# Patient Record
Sex: FEMALE | HISPANIC OR LATINO | Employment: UNEMPLOYED | URBAN - METROPOLITAN AREA
[De-identification: names, ages, dates, MRNs, and addresses within clinical notes are randomized per-mention and may not be internally consistent; named-entity substitution may affect disease eponyms.]

---

## 2017-03-15 ENCOUNTER — HOSPITAL ENCOUNTER (EMERGENCY)
Facility: MEDICAL CENTER | Age: 62
End: 2017-03-15
Payer: MEDICAID

## 2017-03-15 VITALS
HEART RATE: 82 BPM | BODY MASS INDEX: 33.3 KG/M2 | HEIGHT: 61 IN | TEMPERATURE: 98.6 F | RESPIRATION RATE: 16 BRPM | SYSTOLIC BLOOD PRESSURE: 154 MMHG | DIASTOLIC BLOOD PRESSURE: 78 MMHG | OXYGEN SATURATION: 99 % | WEIGHT: 176.37 LBS

## 2017-03-15 PROCEDURE — 302449 STATCHG TRIAGE ONLY (STATISTIC)

## 2017-03-15 ASSESSMENT — PAIN SCALES - GENERAL: PAINLEVEL_OUTOF10: 10

## 2017-03-15 NOTE — ED NOTES
"C/O NAUSEA AND UPPER ABDO PAIN, NO VOMITING, FEELS LIKE WHEN SHE HAD HER GALLBLADDER SURGERY ' I JUST HAD THE STONES OUT' PAIN COMES IN WAVES, ALSO EXP DYSURIA, STARTS STREAM THEN CANNOT FINISH \" HAS TO PUSH IT OUT\" IDDM FSBS 'S  "

## 2017-03-16 ENCOUNTER — OFFICE VISIT (OUTPATIENT)
Dept: CARDIOLOGY | Facility: MEDICAL CENTER | Age: 62
End: 2017-03-16
Payer: MEDICAID

## 2017-03-16 VITALS
DIASTOLIC BLOOD PRESSURE: 84 MMHG | HEART RATE: 84 BPM | OXYGEN SATURATION: 98 % | BODY MASS INDEX: 33.23 KG/M2 | WEIGHT: 176 LBS | HEIGHT: 61 IN | SYSTOLIC BLOOD PRESSURE: 152 MMHG

## 2017-03-16 DIAGNOSIS — R07.2 PRECORDIAL PAIN: ICD-10-CM

## 2017-03-16 DIAGNOSIS — E08.42 DIABETIC POLYNEUROPATHY ASSOCIATED WITH DIABETES MELLITUS DUE TO UNDERLYING CONDITION (HCC): ICD-10-CM

## 2017-03-16 DIAGNOSIS — I10 ESSENTIAL HYPERTENSION, BENIGN: ICD-10-CM

## 2017-03-16 DIAGNOSIS — N18.3 CKD (CHRONIC KIDNEY DISEASE), STAGE 3 (MODERATE): ICD-10-CM

## 2017-03-16 LAB — EKG IMPRESSION: NORMAL

## 2017-03-16 PROCEDURE — 93000 ELECTROCARDIOGRAM COMPLETE: CPT | Performed by: INTERNAL MEDICINE

## 2017-03-16 PROCEDURE — 99244 OFF/OP CNSLTJ NEW/EST MOD 40: CPT | Performed by: INTERNAL MEDICINE

## 2017-03-16 RX ORDER — LANSOPRAZOLE 30 MG/1
30 CAPSULE, DELAYED RELEASE ORAL DAILY
COMMUNITY
End: 2017-03-27

## 2017-03-16 RX ORDER — AMLODIPINE BESYLATE 10 MG/1
10 TABLET ORAL 2 TIMES DAILY
Qty: 180 TAB | Refills: 3 | Status: SHIPPED | OUTPATIENT
Start: 2017-03-16 | End: 2017-03-27

## 2017-03-16 RX ORDER — SUCRALFATE 1 G/1
1 TABLET ORAL
COMMUNITY
End: 2017-04-28

## 2017-03-16 RX ORDER — CEFDINIR 300 MG/1
300 CAPSULE ORAL 2 TIMES DAILY
COMMUNITY
End: 2017-03-27

## 2017-03-16 ASSESSMENT — ENCOUNTER SYMPTOMS
ABDOMINAL PAIN: 0
WEIGHT LOSS: 0
ORTHOPNEA: 0
FALLS: 0
COUGH: 0
EYE PAIN: 0
BRUISES/BLEEDS EASILY: 0
CHILLS: 0
EYE DISCHARGE: 0
SENSORY CHANGE: 0
DEPRESSION: 0
SHORTNESS OF BREATH: 0
FEVER: 0
HEADACHES: 0
HALLUCINATIONS: 0
DIZZINESS: 0
LOSS OF CONSCIOUSNESS: 0
SPEECH CHANGE: 0
NAUSEA: 0
BLURRED VISION: 0
MYALGIAS: 0
PALPITATIONS: 0
DOUBLE VISION: 0
VOMITING: 0
CLAUDICATION: 0
BLOOD IN STOOL: 0
PND: 0

## 2017-03-16 NOTE — Clinical Note
"     [facility]   [address], [place] [address]  [place] [address]  Phone: [phone]  Fax: [phone]              Veda Ford  1955    Encounter Date: 3/16/2017    Lyly Leary M.D.          PROGRESS NOTE:  Subjective:   Veda Ford is a 61 y.o. female who presents today for cardiology and evaluation because of chest pain. With the last several weeks, she has had some tightness on her chest along with shoulder pain. It happens at random times. No specific precipitating factors or symptoms. No prior cardiac conditions. No prior cardiac surgery or procedures.    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 at [place] [facility] [place]   • Colonoscopy  9/6/2013     Performed by Dwight Back M.D. at [place] [facility] [place]   • Other Bilateral 2015     catacrat surgery   • Mago by laparoscopy  3/16/2016     Procedure: MAGO BY LAPAROSCOPY;  Surgeon: Oskar Cheng M.D.;  [place] [facility] [place];  Service:      Family History   Problem Relation Age of Onset   • Diabetes Mother    • Heart Disease Father    • Lung Disease Father    • Diabetes Brother      History   Smoking status   • Never Smoker    Smokeless tobacco   • Never Used     Allergies   Allergen Reactions   • Gabapentin Unspecified     \"Leg aches and pains\"  RXN=1/2016     Outpatient Encounter Prescriptions as of 3/16/2017   Medication " Sig Dispense Refill   • sucralfate (CARAFATE) 1 GM Tab Take 1 g by mouth 4 Times a Day,Before Meals and at Bedtime.     • lansoprazole (PREVACID) 30 MG CAPSULE DELAYED RELEASE Take 30 mg by mouth every day.     • cefdinir (OMNICEF) 300 MG Cap Take 300 mg by mouth 2 times a day.     • amlodipine (NORVASC) 10 MG Tab Take 1 Tab by mouth 2 Times a Day. 180 Tab 3   • aspirin EC (ECOTRIN) 81 MG Tablet Delayed Response Take 81 mg by mouth every day.     • esomeprazole (NEXIUM) 40 MG delayed-release capsule Take 40 mg by mouth every morning before breakfast.     • atorvastatin (LIPITOR) 40 MG Tab Take 40 mg by mouth every evening.     • clonidine (CATAPRES) 0.1 MG Tab Take 0.1 mg by mouth every evening.     • insulin glargine (LANTUS) 100 UNIT/ML SOLN Inject 40 Units as instructed 2 times a day.     • insulin lispro (HUMALOG KWIKPEN) 100 UNIT/ML SOLN Inject 6-20 Units as instructed 3 times a day before meals. Sliding scale.  If BS =3 units  131-199=6 units     • losartan (COZAAR) 100 MG TABS Take 100 mg by mouth every day.     • levothyroxine (SYNTHROID) 100 MCG Tab Take 100 mcg by mouth Every morning on an empty stomach.     • hydrocodone-acetaminophen (NORCO) 5-325 MG Tab per tablet Take 1-2 Tabs by mouth every four hours as needed. (Patient not taking: Reported on 3/16/2017) 30 Tab 0   • loratadine (CLARITIN) 10 MG Tab Take 10 mg by mouth every day.     • amitriptyline (ELAVIL) 25 MG Tab Take 25 mg by mouth every evening.     • [DISCONTINUED] amlodipine (NORVASC) 10 MG TABS Take 10 mg by mouth every day.       No facility-administered encounter medications on file as of 3/16/2017.     Review of Systems   Constitutional: Negative for fever, chills, weight loss and malaise/fatigue.   HENT: Negative for ear discharge, ear pain, hearing loss and nosebleeds.    Eyes: Negative for blurred vision, double vision, pain and discharge.   Respiratory: Negative for cough and shortness of breath.    Cardiovascular: Positive for  "chest pain. Negative for palpitations, orthopnea, claudication, leg swelling and PND.   Gastrointestinal: Negative for nausea, vomiting, abdominal pain, blood in stool and melena.   Genitourinary: Negative for dysuria and hematuria.   Musculoskeletal: Negative for myalgias, joint pain and falls.   Skin: Negative for itching and rash.   Neurological: Negative for dizziness, sensory change, speech change, loss of consciousness and headaches.   Endo/Heme/Allergies: Negative for environmental allergies. Does not bruise/bleed easily.   Psychiatric/Behavioral: Negative for depression, suicidal ideas and hallucinations.        Objective:   /84 mmHg  Pulse 84  Ht 1.549 m (5' 0.98\")  Wt 79.833 kg (176 lb)  BMI 33.27 kg/m2  SpO2 98%    Physical Exam   Constitutional: She is oriented to person, place, and time. No distress.   HENT:   Head: Normocephalic and atraumatic.   Eyes: EOM are normal.   Neck: No JVD present.   Cardiovascular: Normal rate, regular rhythm, normal heart sounds and intact distal pulses.  Exam reveals no gallop and no friction rub.    No murmur heard.  Pulmonary/Chest: No respiratory distress. She has no wheezes. She has no rales. She exhibits no tenderness.   Abdominal: She exhibits no distension. There is no tenderness. There is no rebound and no guarding.   Musculoskeletal: She exhibits no edema or tenderness.   Lymphadenopathy:     She has no cervical adenopathy.   Neurological: She is alert and oriented to person, place, and time.   Skin: Skin is dry.   Psychiatric: She has a normal mood and affect.   Nursing note and vitals reviewed.      Assessment:     1. Precordial pain  ECHOCARDIOGRAM COMP W/O CONT    NM-CARDIAC PET   2. Essential hypertension, benign  EKG    ECHOCARDIOGRAM COMP W/O CONT    NM-CARDIAC PET    amlodipine (NORVASC) 10 MG Tab   3. Diabetic polyneuropathy associated with diabetes mellitus due to underlying condition (CMS-[facility])  ECHOCARDIOGRAM COMP W/O CONT    NM-CARDIAC PET   "   4. CKD (chronic kidney disease), stage 3 (moderate)  ECHOCARDIOGRAM COMP W/O CONT    NM-CARDIAC PET       Medical Decision Making:  Today's Assessment / Status / Plan:     At this time, to further risk stratify the patient, I will obtain a transthoracic echocardiogram to assess for cardiac functions and valvular functions. I will also obtain a myocardial PET scan stress test to assess for coronary ischemia.    Blood pressure is elevated today. I will increase her amlodipine to 10 mg by mouth twice a day. Continue losartan and clonidine. She does have CKD stage 3.    I will see patient back in clinic with lab tests and studies results in 3 months.    I thank you Dr. Reese for referring patient to our Cardiology Clinic today.        Jesika Reese, N.P.  [address]  [place] [address]  VIA Facsimile: [phone]

## 2017-03-16 NOTE — PROGRESS NOTES
"Subjective:   Veda Ford is a 61 y.o. female who presents today for cardiology and evaluation because of chest pain. With the last several weeks, she has had some tightness on her chest along with shoulder pain. It happens at random times. No specific precipitating factors or symptoms. No prior cardiac conditions. No prior cardiac surgery or procedures.    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 at SURGERY [facility]   • Colonoscopy  9/6/2013     Performed by Dwight Back M.D. at [facility]   • Other Bilateral 2015     catacrat surgery   • Mago by laparoscopy  3/16/2016     Procedure: MAGO BY LAPAROSCOPY;  Surgeon: Oskar Cheng M.D.;  Location: SURGERY [facility];  Service:      Family History   Problem Relation Age of Onset   • Diabetes Mother    • Heart Disease Father    • Lung Disease Father    • Diabetes Brother      History   Smoking status   • Never Smoker    Smokeless tobacco   • Never Used     Allergies   Allergen Reactions   • Gabapentin Unspecified     \"Leg aches and pains\"  RXN=1/2016     Outpatient Encounter Prescriptions as of 3/16/2017   Medication Sig Dispense Refill   • sucralfate (CARAFATE) 1 GM Tab Take 1 g by mouth 4 Times a Day,Before Meals and at Bedtime.     • lansoprazole (PREVACID) 30 MG CAPSULE DELAYED RELEASE Take 30 mg by mouth every day.     • cefdinir (OMNICEF) 300 MG Cap Take 300 mg by mouth 2 " times a day.     • amlodipine (NORVASC) 10 MG Tab Take 1 Tab by mouth 2 Times a Day. 180 Tab 3   • aspirin EC (ECOTRIN) 81 MG Tablet Delayed Response Take 81 mg by mouth every day.     • esomeprazole (NEXIUM) 40 MG delayed-release capsule Take 40 mg by mouth every morning before breakfast.     • atorvastatin (LIPITOR) 40 MG Tab Take 40 mg by mouth every evening.     • clonidine (CATAPRES) 0.1 MG Tab Take 0.1 mg by mouth every evening.     • insulin glargine (LANTUS) 100 UNIT/ML SOLN Inject 40 Units as instructed 2 times a day.     • insulin lispro (HUMALOG KWIKPEN) 100 UNIT/ML SOLN Inject 6-20 Units as instructed 3 times a day before meals. Sliding scale.  If BS =3 units  131-199=6 units     • losartan (COZAAR) 100 MG TABS Take 100 mg by mouth every day.     • levothyroxine (SYNTHROID) 100 MCG Tab Take 100 mcg by mouth Every morning on an empty stomach.     • hydrocodone-acetaminophen (NORCO) 5-325 MG Tab per tablet Take 1-2 Tabs by mouth every four hours as needed. (Patient not taking: Reported on 3/16/2017) 30 Tab 0   • loratadine (CLARITIN) 10 MG Tab Take 10 mg by mouth every day.     • amitriptyline (ELAVIL) 25 MG Tab Take 25 mg by mouth every evening.     • [DISCONTINUED] amlodipine (NORVASC) 10 MG TABS Take 10 mg by mouth every day.       No facility-administered encounter medications on file as of 3/16/2017.     Review of Systems   Constitutional: Negative for fever, chills, weight loss and malaise/fatigue.   HENT: Negative for ear discharge, ear pain, hearing loss and nosebleeds.    Eyes: Negative for blurred vision, double vision, pain and discharge.   Respiratory: Negative for cough and shortness of breath.    Cardiovascular: Positive for chest pain. Negative for palpitations, orthopnea, claudication, leg swelling and PND.   Gastrointestinal: Negative for nausea, vomiting, abdominal pain, blood in stool and melena.   Genitourinary: Negative for dysuria and hematuria.   Musculoskeletal: Negative for  "myalgias, joint pain and falls.   Skin: Negative for itching and rash.   Neurological: Negative for dizziness, sensory change, speech change, loss of consciousness and headaches.   Endo/Heme/Allergies: Negative for environmental allergies. Does not bruise/bleed easily.   Psychiatric/Behavioral: Negative for depression, suicidal ideas and hallucinations.        Objective:   /84 mmHg  Pulse 84  Ht 1.549 m (5' 0.98\")  Wt 79.833 kg (176 lb)  BMI 33.27 kg/m2  SpO2 98%    Physical Exam   Constitutional: She is oriented to person, place, and time. No distress.   HENT:   Head: Normocephalic and atraumatic.   Eyes: EOM are normal.   Neck: No JVD present.   Cardiovascular: Normal rate, regular rhythm, normal heart sounds and intact distal pulses.  Exam reveals no gallop and no friction rub.    No murmur heard.  Pulmonary/Chest: No respiratory distress. She has no wheezes. She has no rales. She exhibits no tenderness.   Abdominal: She exhibits no distension. There is no tenderness. There is no rebound and no guarding.   Musculoskeletal: She exhibits no edema or tenderness.   Lymphadenopathy:     She has no cervical adenopathy.   Neurological: She is alert and oriented to person, place, and time.   Skin: Skin is dry.   Psychiatric: She has a normal mood and affect.   Nursing note and vitals reviewed.      Assessment:     1. Precordial pain  ECHOCARDIOGRAM COMP W/O CONT    NM-CARDIAC PET   2. Essential hypertension, benign  EKG    ECHOCARDIOGRAM COMP W/O CONT    NM-CARDIAC PET    amlodipine (NORVASC) 10 MG Tab   3. Diabetic polyneuropathy associated with diabetes mellitus due to underlying condition (CMS-[facility])  ECHOCARDIOGRAM COMP W/O CONT    NM-CARDIAC PET   4. CKD (chronic kidney disease), stage 3 (moderate)  ECHOCARDIOGRAM COMP W/O CONT    NM-CARDIAC PET       Medical Decision Making:  Today's Assessment / Status / Plan:     At this time, to further risk stratify the patient, I will obtain a transthoracic " echocardiogram to assess for cardiac functions and valvular functions. I will also obtain a myocardial PET scan stress test to assess for coronary ischemia.    Blood pressure is elevated today. I will increase her amlodipine to 10 mg by mouth twice a day. Continue losartan and clonidine. She does have CKD stage 3.    I will see patient back in clinic with lab tests and studies results in 3 months.    I thank you Dr. Reese for referring patient to our Cardiology Clinic today.

## 2017-03-16 NOTE — MR AVS SNAPSHOT
"        Veda Ford   3/16/2017 10:00 AM   Office Visit   MRN: [id number]    Department:  [facility]   Dept Phone:  [phone]    Description:  Female : 1955   Provider:  Lyly Leary M.D.           Reason for Visit     New Patient           Allergies as of 3/16/2017     Allergen Noted Reactions    Gabapentin 2015   Unspecified    \"Leg aches and pains\"  RXN=2016      You were diagnosed with     Precordial pain   [786.51.ICD-9-CM]       Essential hypertension, benign   [401.1.ICD-9-CM]       Diabetic polyneuropathy associated with diabetes mellitus due to underlying condition (CMS-[facility])   [2730785]       CKD (chronic kidney disease), stage 3 (moderate)   [8078832]         Vital Signs     Blood Pressure Pulse Height Weight Body Mass Index Oxygen Saturation    152/84 mmHg 84 1.549 m (5' 0.98\") 79.833 kg (176 lb) 33.27 kg/m2 98%    Smoking Status                   Never Smoker            Basic Information     Date Of Birth Sex Race Ethnicity Preferred Language    1955 Female  or   Origin (Estonian,Surinamese,Micronesian,Spanish, etc) English      Your appointments     Mar 16, 2017 10:00 AM   NEW PATIENT with Lyly Leary M.D.   [facility] (--)    [address], [address] [address]  [facility] [id number] [phone]              Problem List              ICD-10-CM Priority Class Noted - Resolved    Essential hypertension, benign I10   2009 - Present    Other and unspecified hyperlipidemia E78.5   2009 - Present    GERD (gastroesophageal reflux disease) K21.9   2009 - Present    Vitamin d deficiency    2009 - Present    Diabetes  High  2009 - Present    Shoulder joint pain M25.519   2009 - Present    Carpal tunnel syndrome G56.00   2009 - Present    Rotator cuff tear    2009 - Present    Diabetic neuropathy (CMS-[facility]) E11.40   2010 - Present    Myalgia M79.1   2010 - Present    Renal " insufficiency N28.9   11/15/2011 - Present    Hypothyroid E03.9   11/15/2011 - Present    Microalbuminuria R80.9   11/15/2011 - Present    ARF (acute renal failure) (CMS-HCC) N17.9 High  4/26/2012 - Present    Hyperglycemia R73.9 High  4/26/2012 - Present    Lower GI bleed K92.2   9/5/2013 - Present    DIABETES MELLITUS    9/6/2013 - Present    Acute blood loss anemia D62   9/6/2013 - Present    Chest pain R07.9   12/3/2015 - Present    Cholecystolithiasis K80.20   3/16/2016 - Present      Health Maintenance        Date Due Completion Dates    DIABETES MONOFILAMENT / LE EXAM 3/27/1956 ---    RETINAL SCREENING 9/27/1972 ---    IMM DTaP/Tdap/Td Vaccine (1 - Tdap) 9/27/1973 ---    IMM PNEUMOCOCCAL 19-64 (ADULT) MEDIUM RISK SERIES (1 of 1 - PPSV23) 9/27/1973 ---    PAP SMEAR 9/27/1975 ---    MAMMOGRAM 9/27/1994 ---    URINE ACR / MICROALBUMIN 11/12/2012 11/12/2011, 4/13/2010, 12/15/2009    IMM ZOSTER VACCINE 9/27/2014 ---    A1C SCREENING 6/4/2016 12/4/2015, 4/26/2012, 11/12/2011, 6/21/2011, 12/6/2010, 4/13/2010, 12/15/2009, 1/5/2009, 8/26/2008, 2/27/2008, 7/10/2007, 3/24/2007, 8/29/2006    IMM INFLUENZA (1) 9/1/2016 9/5/2013, 11/15/2011, 10/20/2010, 10/7/2009    FASTING LIPID PROFILE 12/4/2016 12/4/2015, 4/26/2012, 11/12/2011, 6/21/2011, 12/6/2010, 4/13/2010, 12/15/2009, 7/31/2009, 8/26/2008, 2/27/2008, 7/10/2007, 3/24/2007, 8/29/2006    SERUM CREATININE 3/15/2017 3/15/2016, 1/15/2016, 12/4/2015, 12/3/2015, 9/7/2013, 9/6/2013, 9/5/2013, 4/27/2012, 4/26/2012, 4/25/2012, 11/12/2011, 6/21/2011, 3/4/2011, 12/6/2010, 4/13/2010, 12/15/2009, 7/31/2009, 1/5/2009, 8/26/2008, 2/27/2008, 7/10/2007, 3/24/2007, 8/29/2006    COLONOSCOPY 9/6/2023 9/6/2013            Results       Current Immunizations     Influenza TIV (IM) 9/5/2013  8:29 PM, 11/15/2011, 10/20/2010, 10/7/2009      Below and/or attached are the medications your provider expects you to take. Review all of your home medications and newly ordered medications with your  provider and/or pharmacist. Follow medication instructions as directed by your provider and/or pharmacist. Please keep your medication list with you and share with your provider. Update the information when medications are discontinued, doses are changed, or new medications (including over-the-counter products) are added; and carry medication information at all times in the event of emergency situations     Allergies:  GABAPENTIN - Unspecified               Medications  Valid as of: March 16, 2017 -  9:54 AM    Generic Name Brand Name Tablet Size Instructions for use    Amitriptyline HCl (Tab) ELAVIL 25 MG Take 25 mg by mouth every evening.        AmLODIPine Besylate (Tab) NORVASC 10 MG Take 1 Tab by mouth 2 Times a Day.        Aspirin (Tablet Delayed Response) ECOTRIN 81 MG Take 81 mg by mouth every day.        Atorvastatin Calcium (Tab) LIPITOR 40 MG Take 40 mg by mouth every evening.        Cefdinir (Cap) OMNICEF 300 MG Take 300 mg by mouth 2 times a day.        CloNIDine HCl (Tab) CATAPRES 0.1 MG Take 0.1 mg by mouth every evening.        Esomeprazole Magnesium (CAPSULE DELAYED RELEASE) NEXIUM 40 MG Take 40 mg by mouth every morning before breakfast.        Hydrocodone-Acetaminophen (Tab) NORCO 5-325 MG Take 1-2 Tabs by mouth every four hours as needed.        Insulin Glargine (Solution) LANTUS 100 UNIT/ML Inject 40 Units as instructed 2 times a day.        Insulin Lispro (Solution) HUMALOG 100 UNIT/ML Inject 6-20 Units as instructed 3 times a day before meals. Sliding scale.  If BS =3 units  131-199=6 units        Lansoprazole (CAPSULE DELAYED RELEASE) PREVACID 30 MG Take 30 mg by mouth every day.        Levothyroxine Sodium (Tab) SYNTHROID 100 MCG Take 100 mcg by mouth Every morning on an empty stomach.        Loratadine (Tab) CLARITIN 10 MG Take 10 mg by mouth every day.        Losartan Potassium (Tab) COZAAR 100 MG Take 100 mg by mouth every day.        Sucralfate (Tab) CARAFATE 1 GM Take 1 g by mouth  4 Times a Day,Before Meals and at Bedtime.        .                 Medicines prescribed today were sent to:     [id number] - [place], [place] - [address] [place] [address]    Phone: [phone] Fax: [phone]    Open 24 Hours?: No      Medication refill instructions:       If your prescription bottle indicates you have medication refills left, it is not necessary to call your provider’s office. Please contact your pharmacy and they will refill your medication.    If your prescription bottle indicates you do not have any refills left, you may request refills at any time through one of the following ways: The online [employer] system (except Urgent Care), by calling your provider’s office, or by asking your pharmacy to contact your provider’s office with a refill request. Medication refills are processed only during regular business hours and may not be available until the next business day. Your provider may request additional information or to have a follow-up visit with you prior to refilling your medication.   *Please Note: Medication refills are assigned a new Rx number when refilled electronically. Your pharmacy may indicate that no refills were authorized even though a new prescription for the same medication is available at the pharmacy. Please request the medicine by name with the pharmacy before contacting your provider for a refill.        Your To Do List     Future Labs/Procedures Complete By Expires    ECHOCARDIOGRAM COMP W/O CONT  As directed 3/17/2018    NM-CARDIAC PET  As directed 3/16/2018      Other Notes About Your Plan     +UDS.  No further narcotics           Array Storm Access Code: [email or web address]  Expires: 4/15/2017  9:54 AM    [employer] Storm  A secure, online tool to manage your health information     [employer]’s Array Storm® is a secure, online tool that connects you to your personalized health information from the privacy of your home -- day or night - making it  very easy for you to manage your healthcare. Once the activation process is completed, you can even access your medical information using the Signadyne winifred, which is available for free in the Apple Winifred store or Google Play store.     Signadyne provides the following levels of access (as shown below):   My Chart Features   [facility] Primary Care Doctor [facility]  Specialists [facility]  Urgent  Care Non-[facility]  Primary Care  Doctor   Email your healthcare team securely and privately 24/7 X X X    Manage appointments: schedule your next appointment; view details of past/upcoming appointments X      Request prescription refills. X      View recent personal medical records, including lab and immunizations X X X X   View health record, including health history, allergies, medications X X X X   Read reports about your outpatient visits, procedures, consult and ER notes X X X X   See your discharge summary, which is a recap of your hospital and/or ER visit that includes your diagnosis, lab results, and care plan. X X       How to register for Signadyne:  1. Go to  [email or web address].  2. Click on the Sign Up Now box, which takes you to the New Member Sign Up page. You will need to provide the following information:  a. Enter your Signadyne Access Code exactly as it appears at the top of this page. (You will not need to use this code after you’ve completed the sign-up process. If you do not sign up before the expiration date, you must request a new code.)   b. Enter your date of birth.   c. Enter your home email address.   d. Click Submit, and follow the next screen’s instructions.  3. Create a Signadyne ID. This will be your Signadyne login ID and cannot be changed, so think of one that is secure and easy to remember.  4. Create a Signadyne password. You can change your password at any time.  5. Enter your Password Reset Question and Answer. This can be used at a later time if you forget your password.   6. Enter your e-mail address. This  allows you to receive e-mail notifications when new information is available in [employer].  7. Click Sign Up. You can now view your health information.    For assistance activating your [employer] account, call [phone]

## 2017-03-27 ENCOUNTER — RESOLUTE PROFESSIONAL BILLING HOSPITAL PROF FEE (OUTPATIENT)
Dept: HOSPITALIST | Facility: MEDICAL CENTER | Age: 62
End: 2017-03-27
Payer: MEDICAID

## 2017-03-27 ENCOUNTER — HOSPITAL ENCOUNTER (INPATIENT)
Facility: MEDICAL CENTER | Age: 62
LOS: 5 days | DRG: 699 | End: 2017-04-01
Attending: EMERGENCY MEDICINE | Admitting: HOSPITALIST
Payer: MEDICAID

## 2017-03-27 ENCOUNTER — APPOINTMENT (OUTPATIENT)
Dept: RADIOLOGY | Facility: MEDICAL CENTER | Age: 62
DRG: 699 | End: 2017-03-27
Attending: EMERGENCY MEDICINE
Payer: MEDICAID

## 2017-03-27 ENCOUNTER — APPOINTMENT (OUTPATIENT)
Dept: RADIOLOGY | Facility: MEDICAL CENTER | Age: 62
DRG: 699 | End: 2017-03-27
Attending: HOSPITALIST
Payer: MEDICAID

## 2017-03-27 DIAGNOSIS — N04.9 ANASARCA ASSOCIATED WITH DISORDER OF KIDNEY: ICD-10-CM

## 2017-03-27 LAB
ALBUMIN SERPL BCP-MCNC: 3.2 G/DL (ref 3.2–4.9)
ALBUMIN/GLOB SERPL: 0.9 G/DL
ALP SERPL-CCNC: 125 U/L (ref 30–99)
ALT SERPL-CCNC: 15 U/L (ref 2–50)
ANION GAP SERPL CALC-SCNC: 7 MMOL/L (ref 0–11.9)
APPEARANCE UR: CLEAR
AST SERPL-CCNC: 16 U/L (ref 12–45)
BACTERIA #/AREA URNS HPF: ABNORMAL /HPF
BASOPHILS # BLD AUTO: 0.5 % (ref 0–1.8)
BASOPHILS # BLD AUTO: 0.6 % (ref 0–1.8)
BASOPHILS # BLD: 0.04 K/UL (ref 0–0.12)
BASOPHILS # BLD: 0.06 K/UL (ref 0–0.12)
BILIRUB SERPL-MCNC: 0.3 MG/DL (ref 0.1–1.5)
BILIRUB UR QL STRIP.AUTO: NEGATIVE
BNP SERPL-MCNC: 108 PG/ML (ref 0–100)
BUN SERPL-MCNC: 32 MG/DL (ref 8–22)
C3 SERPL-MCNC: 141 MG/DL (ref 87–200)
C4 SERPL-MCNC: 46 MG/DL (ref 19–52)
CALCIUM SERPL-MCNC: 8.6 MG/DL (ref 8.5–10.5)
CHLORIDE SERPL-SCNC: 107 MMOL/L (ref 96–112)
CHLORIDE UR-SCNC: 45 MMOL/L
CO2 SERPL-SCNC: 19 MMOL/L (ref 20–33)
COLOR UR: COLORLESS
CREAT SERPL-MCNC: 2.78 MG/DL (ref 0.5–1.4)
CREAT UR-MCNC: 28 MG/DL
CULTURE IF INDICATED INDCX: NO UA CULTURE
EOSINOPHIL # BLD AUTO: 0.26 K/UL (ref 0–0.51)
EOSINOPHIL # BLD AUTO: 0.29 K/UL (ref 0–0.51)
EOSINOPHIL NFR BLD: 3 % (ref 0–6.9)
EOSINOPHIL NFR BLD: 3.2 % (ref 0–6.9)
EPI CELLS #/AREA URNS HPF: ABNORMAL /HPF
ERYTHROCYTE [DISTWIDTH] IN BLOOD BY AUTOMATED COUNT: 37.6 FL (ref 35.9–50)
ERYTHROCYTE [DISTWIDTH] IN BLOOD BY AUTOMATED COUNT: 39.1 FL (ref 35.9–50)
GFR SERPL CREATININE-BSD FRML MDRD: 17 ML/MIN/1.73 M 2
GLOBULIN SER CALC-MCNC: 3.5 G/DL (ref 1.9–3.5)
GLUCOSE BLD-MCNC: 197 MG/DL (ref 65–99)
GLUCOSE SERPL-MCNC: 246 MG/DL (ref 65–99)
GLUCOSE UR STRIP.AUTO-MCNC: 300 MG/DL
HAV IGM SERPL QL IA: NEGATIVE
HBV CORE IGM SER QL: NEGATIVE
HBV SURFACE AG SER QL: NEGATIVE
HCT VFR BLD AUTO: 33.4 % (ref 37–47)
HCT VFR BLD AUTO: 35.6 % (ref 37–47)
HCV AB SER QL: REACTIVE
HGB BLD-MCNC: 10.8 G/DL (ref 12–16)
HGB BLD-MCNC: 11.8 G/DL (ref 12–16)
IMM GRANULOCYTES # BLD AUTO: 0.03 K/UL (ref 0–0.11)
IMM GRANULOCYTES # BLD AUTO: 0.06 K/UL (ref 0–0.11)
IMM GRANULOCYTES NFR BLD AUTO: 0.4 % (ref 0–0.9)
IMM GRANULOCYTES NFR BLD AUTO: 0.6 % (ref 0–0.9)
KETONES UR STRIP.AUTO-MCNC: NEGATIVE MG/DL
LEUKOCYTE ESTERASE UR QL STRIP.AUTO: NEGATIVE
LYMPHOCYTES # BLD AUTO: 1.71 K/UL (ref 1–4.8)
LYMPHOCYTES # BLD AUTO: 2.17 K/UL (ref 1–4.8)
LYMPHOCYTES NFR BLD: 20.8 % (ref 22–41)
LYMPHOCYTES NFR BLD: 22.7 % (ref 22–41)
MCH RBC QN AUTO: 28.1 PG (ref 27–33)
MCH RBC QN AUTO: 28.4 PG (ref 27–33)
MCHC RBC AUTO-ENTMCNC: 32.3 G/DL (ref 33.6–35)
MCHC RBC AUTO-ENTMCNC: 33.1 G/DL (ref 33.6–35)
MCV RBC AUTO: 85.8 FL (ref 81.4–97.8)
MCV RBC AUTO: 86.8 FL (ref 81.4–97.8)
MICRO URNS: ABNORMAL
MONOCYTES # BLD AUTO: 0.69 K/UL (ref 0–0.85)
MONOCYTES # BLD AUTO: 0.77 K/UL (ref 0–0.85)
MONOCYTES NFR BLD AUTO: 8.1 % (ref 0–13.4)
MONOCYTES NFR BLD AUTO: 8.4 % (ref 0–13.4)
NEUTROPHILS # BLD AUTO: 5.5 K/UL (ref 2–7.15)
NEUTROPHILS # BLD AUTO: 6.21 K/UL (ref 2–7.15)
NEUTROPHILS NFR BLD: 65 % (ref 44–72)
NEUTROPHILS NFR BLD: 66.7 % (ref 44–72)
NITRITE UR QL STRIP.AUTO: NEGATIVE
NRBC # BLD AUTO: 0 K/UL
NRBC # BLD AUTO: 0 K/UL
NRBC BLD AUTO-RTO: 0 /100 WBC
NRBC BLD AUTO-RTO: 0 /100 WBC
PH UR STRIP.AUTO: 7 [PH]
PLATELET # BLD AUTO: 265 K/UL (ref 164–446)
PLATELET # BLD AUTO: 282 K/UL (ref 164–446)
PMV BLD AUTO: 10 FL (ref 9–12.9)
PMV BLD AUTO: 10.3 FL (ref 9–12.9)
POTASSIUM SERPL-SCNC: 5 MMOL/L (ref 3.6–5.5)
PROT SERPL-MCNC: 6.7 G/DL (ref 6–8.2)
PROT UR QL STRIP: 200 MG/DL
PROT UR-MCNC: 308.4 MG/DL (ref 0–15)
RBC # BLD AUTO: 3.85 M/UL (ref 4.2–5.4)
RBC # BLD AUTO: 4.15 M/UL (ref 4.2–5.4)
RBC # URNS HPF: ABNORMAL /HPF
RBC UR QL AUTO: ABNORMAL
SODIUM SERPL-SCNC: 133 MMOL/L (ref 135–145)
SODIUM UR-SCNC: 48 MMOL/L
SP GR UR STRIP.AUTO: 1
TROPONIN I SERPL-MCNC: <0.01 NG/ML (ref 0–0.04)
WBC # BLD AUTO: 8.2 K/UL (ref 4.8–10.8)
WBC # BLD AUTO: 9.6 K/UL (ref 4.8–10.8)
WBC #/AREA URNS HPF: ABNORMAL /HPF

## 2017-03-27 PROCEDURE — 80074 ACUTE HEPATITIS PANEL: CPT

## 2017-03-27 PROCEDURE — 99223 1ST HOSP IP/OBS HIGH 75: CPT | Performed by: HOSPITALIST

## 2017-03-27 PROCEDURE — 85025 COMPLETE CBC W/AUTO DIFF WBC: CPT | Mod: 91

## 2017-03-27 PROCEDURE — 87522 HEPATITIS C REVRS TRNSCRPJ: CPT

## 2017-03-27 PROCEDURE — 86235 NUCLEAR ANTIGEN ANTIBODY: CPT

## 2017-03-27 PROCEDURE — 82570 ASSAY OF URINE CREATININE: CPT

## 2017-03-27 PROCEDURE — 86255 FLUORESCENT ANTIBODY SCREEN: CPT

## 2017-03-27 PROCEDURE — 700111 HCHG RX REV CODE 636 W/ 250 OVERRIDE (IP): Performed by: HOSPITALIST

## 2017-03-27 PROCEDURE — 82962 GLUCOSE BLOOD TEST: CPT

## 2017-03-27 PROCEDURE — 700111 HCHG RX REV CODE 636 W/ 250 OVERRIDE (IP): Performed by: EMERGENCY MEDICINE

## 2017-03-27 PROCEDURE — A9270 NON-COVERED ITEM OR SERVICE: HCPCS | Performed by: HOSPITALIST

## 2017-03-27 PROCEDURE — 83880 ASSAY OF NATRIURETIC PEPTIDE: CPT

## 2017-03-27 PROCEDURE — 36415 COLL VENOUS BLD VENIPUNCTURE: CPT

## 2017-03-27 PROCEDURE — 84484 ASSAY OF TROPONIN QUANT: CPT

## 2017-03-27 PROCEDURE — 86038 ANTINUCLEAR ANTIBODIES: CPT | Mod: 91

## 2017-03-27 PROCEDURE — 85652 RBC SED RATE AUTOMATED: CPT

## 2017-03-27 PROCEDURE — 99285 EMERGENCY DEPT VISIT HI MDM: CPT

## 2017-03-27 PROCEDURE — 71020 DX-CHEST-2 VIEWS: CPT

## 2017-03-27 PROCEDURE — 80048 BASIC METABOLIC PNL TOTAL CA: CPT

## 2017-03-27 PROCEDURE — 86225 DNA ANTIBODY NATIVE: CPT

## 2017-03-27 PROCEDURE — 81001 URINALYSIS AUTO W/SCOPE: CPT

## 2017-03-27 PROCEDURE — 86140 C-REACTIVE PROTEIN: CPT

## 2017-03-27 PROCEDURE — 700102 HCHG RX REV CODE 250 W/ 637 OVERRIDE(OP): Performed by: HOSPITALIST

## 2017-03-27 PROCEDURE — 96372 THER/PROPH/DIAG INJ SC/IM: CPT

## 2017-03-27 PROCEDURE — 84300 ASSAY OF URINE SODIUM: CPT

## 2017-03-27 PROCEDURE — 86160 COMPLEMENT ANTIGEN: CPT | Mod: 91

## 2017-03-27 PROCEDURE — 84156 ASSAY OF PROTEIN URINE: CPT

## 2017-03-27 PROCEDURE — 80053 COMPREHEN METABOLIC PANEL: CPT

## 2017-03-27 PROCEDURE — 770020 HCHG ROOM/CARE - TELE (206)

## 2017-03-27 PROCEDURE — 96375 TX/PRO/DX INJ NEW DRUG ADDON: CPT

## 2017-03-27 PROCEDURE — 76775 US EXAM ABDO BACK WALL LIM: CPT

## 2017-03-27 PROCEDURE — 82436 ASSAY OF URINE CHLORIDE: CPT

## 2017-03-27 PROCEDURE — 96374 THER/PROPH/DIAG INJ IV PUSH: CPT

## 2017-03-27 PROCEDURE — 84100 ASSAY OF PHOSPHORUS: CPT

## 2017-03-27 RX ORDER — CEFDINIR 300 MG/1
300 CAPSULE ORAL EVERY 12 HOURS
Status: ON HOLD | COMMUNITY
Start: 2017-03-16 | End: 2017-04-01

## 2017-03-27 RX ORDER — ESOMEPRAZOLE MAGNESIUM 40 MG/1
40 CAPSULE, DELAYED RELEASE ORAL PRN
Status: DISCONTINUED | OUTPATIENT
Start: 2017-03-27 | End: 2017-03-27

## 2017-03-27 RX ORDER — ONDANSETRON 4 MG/1
4 TABLET, ORALLY DISINTEGRATING ORAL EVERY 4 HOURS PRN
Status: DISCONTINUED | OUTPATIENT
Start: 2017-03-27 | End: 2017-04-01 | Stop reason: HOSPADM

## 2017-03-27 RX ORDER — AMLODIPINE BESYLATE 10 MG/1
10 TABLET ORAL
Status: DISCONTINUED | OUTPATIENT
Start: 2017-03-27 | End: 2017-03-28

## 2017-03-27 RX ORDER — ACETAMINOPHEN 325 MG/1
650 TABLET ORAL EVERY 6 HOURS PRN
Status: DISCONTINUED | OUTPATIENT
Start: 2017-03-27 | End: 2017-03-28

## 2017-03-27 RX ORDER — AMOXICILLIN 250 MG
2 CAPSULE ORAL 2 TIMES DAILY
Status: DISCONTINUED | OUTPATIENT
Start: 2017-03-27 | End: 2017-04-01 | Stop reason: HOSPADM

## 2017-03-27 RX ORDER — ASPIRIN 325 MG
325 TABLET ORAL EVERY 6 HOURS PRN
COMMUNITY
End: 2017-04-28

## 2017-03-27 RX ORDER — DEXTROSE MONOHYDRATE 25 G/50ML
25 INJECTION, SOLUTION INTRAVENOUS
Status: DISCONTINUED | OUTPATIENT
Start: 2017-03-27 | End: 2017-04-01 | Stop reason: HOSPADM

## 2017-03-27 RX ORDER — ALENDRONATE SODIUM 70 MG/1
70 TABLET ORAL
COMMUNITY
End: 2017-03-27

## 2017-03-27 RX ORDER — AMLODIPINE BESYLATE 10 MG/1
10 TABLET ORAL 2 TIMES DAILY
Status: ON HOLD | COMMUNITY
End: 2017-04-01

## 2017-03-27 RX ORDER — GLIMEPIRIDE 4 MG/1
4 TABLET ORAL
COMMUNITY

## 2017-03-27 RX ORDER — CLONIDINE HYDROCHLORIDE 0.1 MG/1
0.1 TABLET ORAL EVERY 6 HOURS PRN
Status: DISCONTINUED | OUTPATIENT
Start: 2017-03-27 | End: 2017-03-28

## 2017-03-27 RX ORDER — FUROSEMIDE 10 MG/ML
40 INJECTION INTRAMUSCULAR; INTRAVENOUS
Status: DISCONTINUED | OUTPATIENT
Start: 2017-03-27 | End: 2017-03-28

## 2017-03-27 RX ORDER — ONDANSETRON 2 MG/ML
4 INJECTION INTRAMUSCULAR; INTRAVENOUS EVERY 4 HOURS PRN
Status: DISCONTINUED | OUTPATIENT
Start: 2017-03-27 | End: 2017-04-01 | Stop reason: HOSPADM

## 2017-03-27 RX ORDER — MORPHINE SULFATE 4 MG/ML
4 INJECTION, SOLUTION INTRAMUSCULAR; INTRAVENOUS ONCE
Status: COMPLETED | OUTPATIENT
Start: 2017-03-27 | End: 2017-03-27

## 2017-03-27 RX ORDER — BISACODYL 10 MG
10 SUPPOSITORY, RECTAL RECTAL
Status: DISCONTINUED | OUTPATIENT
Start: 2017-03-27 | End: 2017-04-01 | Stop reason: HOSPADM

## 2017-03-27 RX ORDER — OXYCODONE HYDROCHLORIDE 5 MG/1
2.5 TABLET ORAL
Status: DISCONTINUED | OUTPATIENT
Start: 2017-03-27 | End: 2017-03-28

## 2017-03-27 RX ORDER — LORATADINE 10 MG/1
5 TABLET ORAL DAILY
Status: DISCONTINUED | OUTPATIENT
Start: 2017-03-27 | End: 2017-04-01 | Stop reason: HOSPADM

## 2017-03-27 RX ORDER — INSULIN GLARGINE 100 [IU]/ML
40 INJECTION, SOLUTION SUBCUTANEOUS 2 TIMES DAILY
Status: DISCONTINUED | OUTPATIENT
Start: 2017-03-27 | End: 2017-04-01 | Stop reason: HOSPADM

## 2017-03-27 RX ORDER — OMEPRAZOLE 20 MG/1
40 CAPSULE, DELAYED RELEASE ORAL DAILY
Status: DISCONTINUED | OUTPATIENT
Start: 2017-03-27 | End: 2017-04-01 | Stop reason: HOSPADM

## 2017-03-27 RX ORDER — POLYETHYLENE GLYCOL 3350 17 G/17G
1 POWDER, FOR SOLUTION ORAL
Status: DISCONTINUED | OUTPATIENT
Start: 2017-03-27 | End: 2017-04-01 | Stop reason: HOSPADM

## 2017-03-27 RX ORDER — PROMETHAZINE HYDROCHLORIDE 25 MG/1
12.5-25 TABLET ORAL EVERY 4 HOURS PRN
Status: DISCONTINUED | OUTPATIENT
Start: 2017-03-27 | End: 2017-04-01 | Stop reason: HOSPADM

## 2017-03-27 RX ORDER — OXYCODONE HYDROCHLORIDE 5 MG/1
5 TABLET ORAL
Status: DISCONTINUED | OUTPATIENT
Start: 2017-03-27 | End: 2017-03-28

## 2017-03-27 RX ORDER — PROMETHAZINE HYDROCHLORIDE 25 MG/1
12.5-25 SUPPOSITORY RECTAL EVERY 4 HOURS PRN
Status: DISCONTINUED | OUTPATIENT
Start: 2017-03-27 | End: 2017-04-01 | Stop reason: HOSPADM

## 2017-03-27 RX ORDER — ASPIRIN 325 MG
325 TABLET ORAL EVERY 6 HOURS PRN
Status: DISCONTINUED | OUTPATIENT
Start: 2017-03-27 | End: 2017-03-27

## 2017-03-27 RX ORDER — CITALOPRAM 20 MG/1
20 TABLET ORAL DAILY
COMMUNITY
End: 2017-03-27

## 2017-03-27 RX ORDER — ATORVASTATIN CALCIUM 40 MG/1
40 TABLET, FILM COATED ORAL NIGHTLY
Status: DISCONTINUED | OUTPATIENT
Start: 2017-03-27 | End: 2017-04-01 | Stop reason: HOSPADM

## 2017-03-27 RX ORDER — HEPARIN SODIUM 5000 [USP'U]/ML
5000 INJECTION, SOLUTION INTRAVENOUS; SUBCUTANEOUS EVERY 8 HOURS
Status: DISCONTINUED | OUTPATIENT
Start: 2017-03-27 | End: 2017-04-01 | Stop reason: HOSPADM

## 2017-03-27 RX ADMIN — MORPHINE SULFATE 4 MG: 4 INJECTION INTRAVENOUS at 14:41

## 2017-03-27 RX ADMIN — STANDARDIZED SENNA CONCENTRATE AND DOCUSATE SODIUM 2 TABLET: 8.6; 5 TABLET, FILM COATED ORAL at 20:45

## 2017-03-27 RX ADMIN — AMLODIPINE BESYLATE 10 MG: 10 TABLET ORAL at 17:12

## 2017-03-27 RX ADMIN — INSULIN LISPRO 2 UNITS: 100 INJECTION, SOLUTION INTRAVENOUS; SUBCUTANEOUS at 23:17

## 2017-03-27 RX ADMIN — OMEPRAZOLE 40 MG: 20 CAPSULE, DELAYED RELEASE ORAL at 20:45

## 2017-03-27 RX ADMIN — HYDROMORPHONE HYDROCHLORIDE 0.25 MG: 1 INJECTION, SOLUTION INTRAMUSCULAR; INTRAVENOUS; SUBCUTANEOUS at 17:12

## 2017-03-27 RX ADMIN — OXYCODONE HYDROCHLORIDE 5 MG: 5 TABLET ORAL at 20:46

## 2017-03-27 RX ADMIN — HEPARIN SODIUM 5000 UNITS: 5000 INJECTION, SOLUTION INTRAVENOUS; SUBCUTANEOUS at 21:45

## 2017-03-27 RX ADMIN — HEPARIN SODIUM 5000 UNITS: 5000 INJECTION, SOLUTION INTRAVENOUS; SUBCUTANEOUS at 17:11

## 2017-03-27 RX ADMIN — ATORVASTATIN CALCIUM 40 MG: 40 TABLET, FILM COATED ORAL at 20:45

## 2017-03-27 RX ADMIN — INSULIN GLARGINE 40 UNITS: 100 INJECTION, SOLUTION SUBCUTANEOUS at 23:17

## 2017-03-27 RX ADMIN — FUROSEMIDE 40 MG: 10 INJECTION, SOLUTION INTRAVENOUS at 17:11

## 2017-03-27 ASSESSMENT — LIFESTYLE VARIABLES
CONSUMPTION TOTAL: NEGATIVE
AVERAGE NUMBER OF DAYS PER WEEK YOU HAVE A DRINK CONTAINING ALCOHOL: 0
HOW MANY TIMES IN THE PAST YEAR HAVE YOU HAD 5 OR MORE DRINKS IN A DAY: 0
EVER FELT BAD OR GUILTY ABOUT YOUR DRINKING: NO
ON A TYPICAL DAY WHEN YOU DRINK ALCOHOL HOW MANY DRINKS DO YOU HAVE: 0
EVER HAD A DRINK FIRST THING IN THE MORNING TO STEADY YOUR NERVES TO GET RID OF A HANGOVER: NO
HAVE YOU EVER FELT YOU SHOULD CUT DOWN ON YOUR DRINKING: NO
EVER_SMOKED: NEVER
TOTAL SCORE: 0
ALCOHOL_USE: YES
HAVE PEOPLE ANNOYED YOU BY CRITICIZING YOUR DRINKING: NO
TOTAL SCORE: 0
TOTAL SCORE: 0

## 2017-03-27 ASSESSMENT — PAIN SCALES - GENERAL
PAINLEVEL_OUTOF10: 6
PAINLEVEL_OUTOF10: 7
PAINLEVEL_OUTOF10: 6

## 2017-03-27 NOTE — IP AVS SNAPSHOT
" Home Care Instructions                                                                                                                  Name:Veda Ford  Medical Record Number:[id number]  CSN: [id number]    YOB: 1955   Age: 61 y.o.  Sex: female  HT:1.549 m (5' 0.98\") WT: 79.9 kg (176 lb 2.4 oz)          Admit Date: 3/27/2017     Discharge Date:   Today's Date: 4/1/2017  Attending Doctor:  Jason Adan M.D.                  Allergies:  Gabapentin            Discharge Instructions       Discharge Instructions    Discharged to home by car with relative. Discharged via walking, hospital escort: Refused.  Special equipment needed: Not Applicable    Be sure to schedule a follow-up appointment with your primary care doctor or any specialists as instructed.     Discharge Plan:   Influenza Vaccine Indication: Not indicated: Previously immunized this influenza season and > 8 years of age    I understand that a diet low in cholesterol, fat, and sodium is recommended for good health. Unless I have been given specific instructions below for another diet, I accept this instruction as my diet prescription.   Other diet: Low sugar    Special Instructions: None    · Is patient discharged on Warfarin / Coumadin?   No     · Is patient Post Blood Transfusion?  No    Kidney Disease, Adult  The kidneys are two organs that lie on either side of the spine between the middle of the back and the front of the abdomen. The kidneys:   · Remove wastes and extra water from the blood.    · Produce important hormones. These regulate blood pressure, help keep bones strong, and help create red blood cells.    · Balance the fluids and chemicals in the blood and tissues.  Kidney disease occurs when the kidneys are damaged. Kidney damage may be sudden (acute) or develop over a long period (chronic). A small amount of damage may not cause problems, but a large amount of damage may make it difficult or impossible for the kidneys to work " the way they should. Early detection and treatment of kidney disease may prevent kidney damage from becoming permanent or getting worse. Some kidney diseases are curable, but most are not. Many people with kidney disease are able to control the disease and live a normal life.   TYPES OF KIDNEY DISEASE  · Acute kidney injury. Acute kidney injury occurs when there is sudden damage to the kidneys.  · Chronic kidney disease. Chronic kidney disease occurs when the kidneys are damaged over a long period.  · End-stage kidney disease. End-stage kidney disease occurs when the kidneys are so damaged that they stop working. In end-stage kidney disease, the kidneys cannot get better.  CAUSES  Any condition, disease, or event that damages the kidneys may cause kidney disease.  Acute kidney injury.  · A problem with blood flow to the kidneys. This may be caused by:    · Blood loss.    · Heart disease.    · Severe burns.    · Liver disease.  · Direct damage to the kidneys. This may be caused by:  · Some medicines.    · A kidney infection.    · Poisoning or consuming toxic substances.    · A surgical wound.    · A blow to the kidney area.    · A problem with urine flow. This may be caused by:    · Cancer.    · Kidney stones.    · An enlarged prostate.  Chronic kidney disease. The most common causes of chronic kidney disease are diabetes and high blood pressure (hypertension). Chronic kidney disease may also be caused by:   · Diseases that cause the filtering units of the kidneys to become inflamed.    · Diseases that affect the immune system.    · Genetic diseases.    · Medicines that damage the kidneys, such as anti-inflammatory medicines.    · Poisoning or exposure to toxic substances.    · A reoccurring kidney or urinary infection.    · A problem with urine flow. This may be caused by:  · Cancer.    · Kidney stones.    · An enlarged prostate in males.  End-stage kidney disease. This kidney disease usually occurs when a chronic  kidney disease gets worse. It may also occur after acute kidney injury.   SYMPTOMS   · Swelling (edema) of the legs, ankles, or feet.    · Tiredness (lethargy).    · Nausea or vomiting.    · Confusion.    · Problems with urination, such as:    · Painful or burning feeling during urination.    · Decreased urine production.  · Bloody urine.    · Frequent urination, especially at night.  · Hypertension.   · Muscle twitches and cramps.    · Shortness of breath.    · Persistent itchiness.    · Loss of appetite.  · Metallic taste in the mouth.    · Weakness.    · Seizures.    · Chest pain or pressure.    · Trouble sleeping.    · Headaches.    · Abnormally dark or light skin.    · Numbness in the hands or feet.    · Easy bruising.    · Frequent hiccups.    · Menstruation stops.  Sometimes, no symptoms are present.    DIAGNOSIS   Kidney disease may be detected and diagnosed by tests, including blood, urine, imaging, or kidney biopsy tests.   TREATMENT   Acute kidney injury. Treatment of acute kidney injury varies depending on the cause and severity of the kidney damage. In mild cases, no treatment may be needed. The kidneys may heal on their own. If acute kidney injury is more severe, your caregiver will treat the cause of the kidney damage, help the kidneys heal, and prevent complications from occurring. Severe cases may require a procedure to remove toxic wastes from the body (dialysis) or surgery to repair kidney damage. Surgery may involve:   · Repair of a torn kidney.    · Removal of an obstruction.    Most of the time, you will need to stay overnight at the hospital.   Chronic kidney disease. Most chronic kidney diseases cannot be cured. Treatment usually involves relieving symptoms and preventing or slowing the progression of the disease. Treatment may include:   · A special diet. You may need to avoid alcohol and foods that:    · Have added salt.    · Are high in potassium.    · Are high in protein.    · Medicines.  These may:    · Lower blood pressure.    · Relieve anemia.    · Relieve swelling.    · Protect the bones.    End-stage kidney disease. End-stage kidney disease is life-threatening and must be treated immediately. There are two treatments for end-stage kidney disease:   · Dialysis.    · Receiving a new kidney (kidney transplant).  Both of these treatments have serious risks and consequences. In addition to having dialysis or a kidney transplant, you may need to take medicines to control hypertension and cholesterol and to decrease phosphorus levels in your blood.  LENGTH OF ILLNESS  · Acute kidney injury. The length of this disease varies greatly from person to person. Exactly how long it lasts depends on the cause of the kidney damage. Acute kidney injury may develop into chronic kidney disease or end-stage kidney disease.  · Chronic kidney disease. This disease usually lasts a lifetime. Chronic kidney disease may worsen over time to become end-stage kidney disease. The time it takes for end-stage kidney disease to develop varies from person to person.  · End-stage kidney disease. This disease lasts until a kidney transplant is performed.  PREVENTION   Kidney disease can sometimes be prevented. If you have diabetes, hypertension, or any other condition that may lead to kidney disease, you should try to prevent kidney disease with:   · An appropriate diet.  · Medicine.  · Lifestyle changes.  FOR MORE INFORMATION   American Association of Kidney Patients: www.aakp.org   National Kidney Foundation: www.kidney.org   American Kidney Fund: www.akfinc.org   Life Options Rehabilitation Program: www.lifeoptions.org and www.kidneyschool.org   Document Released: 12/18/2006 Document Revised: 12/04/2013 Document Reviewed: 08/16/2013  ExitCare® Patient Information ©2014 [employer] [facility].      Depression / Suicide Risk    As you are discharged from this [facility] facility, it is important to learn how to keep safe from harming  yourself.    Recognize the warning signs:  · Abrupt changes in personality, positive or negative- including increase in energy   · Giving away possessions  · Change in eating patterns- significant weight changes-  positive or negative  · Change in sleeping patterns- unable to sleep or sleeping all the time   · Unwillingness or inability to communicate  · Depression  · Unusual sadness, discouragement and loneliness  · Talk of wanting to die  · Neglect of personal appearance   · Rebelliousness- reckless behavior  · Withdrawal from people/activities they love  · Confusion- inability to concentrate     If you or a loved one observes any of these behaviors or has concerns about self-harm, here's what you can do:  · Talk about it- your feelings and reasons for harming yourself  · Remove any means that you might use to hurt yourself (examples: pills, rope, extension cords, firearm)  · Get professional help from the community (Mental Health, Substance Abuse, psychological counseling)  · Do not be alone:Call your Safe Contact- someone whom you trust who will be there for you.  · Call your local CRISIS HOTLINE 195-3278 or 319-647-4283  · Call your local Children's Mobile Crisis Response Team [place] (713) 238-9584 or www.hyperWALLET Systems  · Call the toll free National Suicide Prevention Hotlines   · National Suicide Prevention Lifeline 650-756-YJRI (7277)  · National Hope Line Network 800-SUICIDE (192-2506)        Your appointments     Apr 12, 2017 10:00 AM   Nm [facility] with [facility] NM CARDIAC PET   [facility] ([facility])    [address] [address]   [phone]            Apr 12, 2017  1:15 PM   ECHO with ECHO [facility] EXAM 12   ECHOCARDIOLOGY [facility] ([facility])    [address] [address]   [phone]           No prep            Jul 18, 2017  3:15 PM   FOLLOW UP with Lyly Leary M.D.   [facility] (--)    [address]  [place] [id number] [phone]              Follow-up Information     1. Schedule an appointment as soon as possible for a visit with Remedios Warner M.D..    Specialty:  Nephrology    Why:  Kidney follow-up    Contact information    [address]  [address]  [place] [id number] [phone]           Discharge Medication Instructions:    Below are the medications your physician expects you to take upon discharge:    Review all your home medications and newly ordered medications with your doctor and/or pharmacist. Follow medication instructions as directed by your doctor and/or pharmacist.    Please keep your medication list with you and share with your physician.               Medication List      START taking these medications        Instructions    carvedilol 6.25 MG Tabs   Last time this was given:  6.25 mg on 4/1/2017 11:05 AM   Commonly known as:  COREG   Next Dose Due:  Tonight with dinner, 4/1.     Take 1 Tab by mouth 2 times a day, with meals.   Dose:  6.25 mg         CHANGE how you take these medications        Instructions    aspirin 325 MG Tabs   What changed:  Another medication with the same name was removed. Continue taking this medication, and follow the directions you see here.   Commonly known as:  ASA   Next Dose Due:  As needed for pain.     Take 325 mg by mouth every 6 hours as needed for Mild Pain.   Dose:  325 mg         CONTINUE taking these medications        Instructions    amlodipine 10 MG Tabs   Last time this was given:  10 mg on 3/28/2017  9:00 AM   Commonly known as:  NORVASC   Next Dose Due:  Tonight with dinner, 4/1.     Take 1 Tab by mouth 2 Times a Day.   Dose:  10 mg       atorvastatin 40 MG Tabs   Last time this was given:  40 mg on 3/31/2017  9:23 PM   Commonly known as:  LIPITOR   Next Dose Due:  Tonight, 4/1.     Take 40 mg by mouth every evening.   Dose:  40 mg       clonidine 0.1 MG Tabs   Commonly known as:  CATAPRES   Next Dose Due:  Tonight, 4/1.     Take  0.1 mg by mouth every evening.   Dose:  0.1 mg       esomeprazole 40 MG delayed-release capsule   Commonly known as:  NEXIUM   Next Dose Due:  Tomorrow morning, 4/2.     Take 40 mg by mouth as needed (For heartburn).   Dose:  40 mg       glimepiride 4 MG Tabs   Commonly known as:  AMARYL   Next Dose Due:  Tomorrow morning, 4/2.     Take 4 mg by mouth every morning.   Dose:  4 mg       HUMALOG KWIKPEN 100 UNIT/ML Soln   Last time this was given:  3 Units on 4/1/2017 11:45 AM   Generic drug:  insulin lispro   Notes to Patient:  Hospital Sliding Scale  151-200: 2u,   201-250: 3u,   251-300: 5u,   301-350: 6u,   351-400: 8u,   over 400: 9u and call your doctor.    Inject 6-20 Units as instructed 3 times a day before meals. Sliding scale. If BS =3 units 131-199=6 units   Dose:  6-20 Units       insulin glargine 100 UNIT/ML Soln   Last time this was given:  40 Units on 4/1/2017 11:06 AM   Commonly known as:  LANTUS   Next Dose Due:  Tonight with dinner, 4/1.     Inject 40 Units as instructed 2 times a day.   Dose:  40 Units       loratadine 10 MG Tabs   Last time this was given:  5 mg on 4/1/2017 11:04 AM   Commonly known as:  CLARITIN   Next Dose Due:  Tomorrow morning, 4/2.     Take 10 mg by mouth every day.   Dose:  10 mg       sucralfate 1 GM Tabs   Commonly known as:  CARAFATE   Next Dose Due:  Resume home regimen.     Take 1 g by mouth 4 Times a Day,Before Meals and at Bedtime.   Dose:  1 g         STOP taking these medications     cefdinir 300 MG Caps   Commonly known as:  OMNICEF   Notes to Patient:  STOP TAKING.        losartan 100 MG Tabs   Commonly known as:  COZAAR   Notes to Patient:  STOP TAKING.                Instructions           Diet / Nutrition:    Follow any diet instructions given to you by your doctor or the dietician, including how much salt (sodium) you are allowed each day.    If you are overweight, talk to your doctor about a weight reduction plan.    Activity:    Remain physically active  following your doctor's instructions about exercise and activity.    Rest often.     Any time you become even a little tired or short of breath, SIT DOWN and rest.    Worsening Symptoms:    Report any of the following signs and symptoms to the doctor's office immediately:    *Pain of jaw, arm, or neck  *Chest pain not relieved by medication                               *Dizziness or loss of consciousness  *Difficulty breathing even when at rest   *More tired than usual                                       *Bleeding drainage or swelling of surgical site  *Swelling of feet, ankles, legs or stomach                 *Fever (>100ºF)  *Pink or blood tinged sputum  *Weight gain (3lbs/day or 5lbs /week)           *Shock from internal defibrillator (if applicable)  *Palpitations or irregular heartbeats                *Cool and/or numb extremities    Stroke Awareness    Common Risk Factors for Stroke include:    Age  Atrial Fibrillation  Carotid Artery Stenosis  Diabetes Mellitus  Excessive alcohol consumption  High blood pressure  Overweight   Physical inactivity  Smoking    Warning signs and symptoms of a stroke include:    *Sudden numbness or weakness of the face, arm or leg (especially on one side of the body).  *Sudden confusion, trouble speaking or understanding.  *Sudden trouble seeing in one or both eyes.  *Sudden trouble walking, dizziness, loss of balance or coordination.Sudden severe headache with no known cause.    It is very important to get treatment quickly when a stroke occurs. If you experience any of the above warning signs, call 911 immediately.                   Disclaimer         Quit Smoking / Tobacco Use:    I understand the use of any tobacco products increases my chance of suffering from future heart disease or stroke and could cause other illnesses which may shorten my life. Quitting the use of tobacco products is the single most important thing I can do to improve my health. For further information  on smoking / tobacco cessation call a Toll Free Quit Line at 1-176.383.2926 (*National Cancer [place]) or 1-259.292.1992 (American Lung Association) or you can access the web based program at www.lungusa.org.    [place] Tobacco Users Help Line:  (178) 954-5643       Toll Free: 1-621.440.3349  Quit Tobacco Program [facility] (685)821-6092    Crisis Hotline:    [place] Crisis Hotline:  7-115-RWNQOXO or 1-961.735.4312    [place] Crisis Hotline:    1-914.499.3657 or 801-096-8994    Discharge Survey:   Thank you for choosing [facility]. We hope we did everything we could to make your hospital stay a pleasant one. You may be receiving a phone survey and we would appreciate your time and participation in answering the questions. Your input is very valuable to us in our efforts to improve our service to our patients and their families.        My signature on this form indicates that:    1. I have reviewed and understand the above information.  2. My questions regarding this information have been answered to my satisfaction.  3. I have formulated a plan with my discharge nurse to obtain my prescribed medications for home.                  Disclaimer         __________________________________                     __________       ________                       Patient Signature                                                 Date                    Time

## 2017-03-27 NOTE — IP AVS SNAPSHOT
4/1/2017          Veda Ford  [address]  [place] [address]    Dear Veda:    [facility] wants to ensure your discharge home is safe and you or your loved ones have had all your questions answered regarding your care after you leave the hospital.    You may receive a telephone call within two days of your discharge.  This call is to make certain you understand your discharge instructions as well as ensure we provided you with the best care possible during your stay with us.     The call will only last approximately 3-5 minutes and will be done by a nurse.    Once again, we want to ensure your discharge home is safe and that you have a clear understanding of any next steps in your care.  If you have any questions or concerns, please do not hesitate to contact us, we are here for you.  Thank you for choosing [facility] for your healthcare needs.    Sincerely,    Ck Castro    [facility]

## 2017-03-27 NOTE — IP AVS SNAPSHOT
Plenummedia Access Code: [email or web address]  Expires: 4/15/2017  9:54 AM    Your email address is not on file at [facility].  Email Addresses are required for you to sign up for [employer]enummedia, please contact [phone] to verify your personal information and to provide your email address prior to attempting to register for [employer]enummedia.    Veda Liliana  [address] [place] [address]    Digital H2Ot  A secure, online tool to manage your health information     [facility]’s Plenummedia® is a secure, online tool that connects you to your personalized health information from the privacy of your home -- day or night - making it very easy for you to manage your healthcare. Once the activation process is completed, you can even access your medical information using the Plenummedia winifred, which is available for free in the Apple Winifred store or Google Play store.     To learn more about Plenummedia, visit [email or web address]    There are two levels of access available (as shown below):   My Chart Features  [facility] Primary Care Doctor [facility]  Specialists [facility]  Urgent  Care Non-[facility] Primary Care Doctor   Email your healthcare team securely and privately 24/7 X X X    Manage appointments: schedule your next appointment; view details of past/upcoming appointments X      Request prescription refills. X      View recent personal medical records, including lab and immunizations X X X X   View health record, including health history, allergies, medications X X X X   Read reports about your outpatient visits, procedures, consult and ER notes X X X X   See your discharge summary, which is a recap of your hospital and/or ER visit that includes your diagnosis, lab results, and care plan X X  X     How to register for Plenummedia:  Once your e-mail address has been verified, follow the following steps to sign up for Plenummedia.     1. Go to  [email or web address]  2. Click on the Sign Up Now box, which takes you to the New Member Sign Up page. You  will need to provide the following information:  a. Enter your "[employer]" Access Code exactly as it appears at the top of this page. (You will not need to use this code after you’ve completed the sign-up process. If you do not sign up before the expiration date, you must request a new code.)   b. Enter your date of birth.   c. Enter your home email address.   d. Click Submit, and follow the next screen’s instructions.  3. Create a [employer] ID. This will be your "[employer]" login ID and cannot be changed, so think of one that is secure and easy to remember.  4. Create a "[employer]" password. You can change your password at any time.  5. Enter your Password Reset Question and Answer. This can be used at a later time if you forget your password.   6. Enter your e-mail address. This allows you to receive e-mail notifications when new information is available in "[employer]".  7. Click Sign Up. You can now view your health information.    For assistance activating your "[employer]" account, call [phone]

## 2017-03-27 NOTE — ED NOTES
"Med rec updated and complete  Allergies reviewed  Pt states \"I had a UTI, not sure of the name of the antibiotic\".  Called [facility] @ [phone] to verify name and strength.  Pt states \"I did not start FOSAMAX 70MG or CELEXA 20MG\".  Pt states \"No vitamins\".    "

## 2017-03-27 NOTE — ED PROVIDER NOTES
ED Provider Note    CHIEF COMPLAINT  Chief Complaint   Patient presents with   • Abnormal Labs   • Leg Swelling   • Shortness of Breath       HPI  Veda Ford is a 61 y.o. female who is sent to the emergency department by the primary care provider for evaluation of acute renal failure although she does have history of stage III chronic kidney disease. Recent blood work showed a decreased GFR although this seems to be stable from recent measurements. The patient does describe diffuse swelling involving her arms and legs as well as her abdomen, she states that she is occasionally short of breath and continues to have episodes of chest pain which she has been evaluated by Dr. Leary, her cardiologist. She has an echocardiogram and a stress test scheduled. Patient has a history of diabetes, hepatitis, hypertension but denies known coronary disease. Weakness, numbness or tingling, she has no other complaints at this time.    REVIEW OF SYSTEMS  Negative for fever, rash, nausea, vomiting, diarrhea, headache, focal weakness, focal numbness, focal tingling, back pain. All other systems are negative.     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 at SURGERY [facility]   • Colonoscopy  9/6/2013     Performed by Dwight Back M.D. at SURGERY [facility]   • Other Bilateral 2015     catacrat surgery   • Mago by laparoscopy  3/16/2016     Procedure: MAGO BY LAPAROSCOPY;  Surgeon: Oskar Cheng M.D.;  Location: SURGERY [facility];  Service:        CURRENT MEDICATIONS  I personally reviewed the medication list in the charting documentation.     ALLERGIES  Allergies   Allergen Reactions   • Gabapentin Unspecified     \"Leg aches and pains\"  RXN=1/2016       MEDICAL RECORD  I have reviewed patient's medical record and pertinent results are listed above.      PHYSICAL EXAM  VITAL SIGNS: /66 mmHg  Pulse 81  Temp(Src) 36.3 °C (97.4 °F) (Temporal)  Resp 16  Ht 1.549 m (5' 0.98\")  Wt 82.2 kg (181 lb 3.5 oz)  BMI 34.26 kg/m2  SpO2 97%   Constitutional: Well appearing patient in no acute distress.  Not toxic, nor ill in appearance.  HENT: Mucus membranes moist.    Eyes: No scleral icterus. Normal conjunctiva   Neck: Supple, comfortable, nonpainful range of motion.   Cardiovascular: Regular heart rate and rhythm.   Thorax & Lungs: Chest is nontender. Bibasilar crackles otherwise lungs are clear with good air movement, no wheezing.   Abdomen: Soft, with no tenderness, rebound nor guarding.  No mass or pulsatile mass appreciated.  Skin: Warm, dry. No rash appreciated  Extremities/Musculoskeletal: 3+ pitting edema involving the lower extremities to her mid thigh, she also has some mild pitting edema of her hands and forearms. Her mild pitting edema involving her anterior abdomen.   Neurologic: Alert & oriented. No focal deficits observed.   Psychiatric: Normal affect appropriate for the clinical situation.    DIAGNOSTIC STUDIES / PROCEDURES    LABS  Results for orders placed or performed during " the hospital encounter of 03/27/17   COMP METABOLIC PANEL   Result Value Ref Range    Sodium 133 (L) 135 - 145 mmol/L    Potassium 5.0 3.6 - 5.5 mmol/L    Chloride 107 96 - 112 mmol/L    Co2 19 (L) 20 - 33 mmol/L    Anion Gap 7.0 0.0 - 11.9    Glucose 246 (H) 65 - 99 mg/dL    Bun 32 (H) 8 - 22 mg/dL    Creatinine 2.78 (H) 0.50 - 1.40 mg/dL    Calcium 8.6 8.5 - 10.5 mg/dL    AST(SGOT) 16 12 - 45 U/L    ALT(SGPT) 15 2 - 50 U/L    Alkaline Phosphatase 125 (H) 30 - 99 U/L    Total Bilirubin 0.3 0.1 - 1.5 mg/dL    Albumin 3.2 3.2 - 4.9 g/dL    Total Protein 6.7 6.0 - 8.2 g/dL    Globulin 3.5 1.9 - 3.5 g/dL    A-G Ratio 0.9 g/dL   CBC WITH DIFFERENTIAL   Result Value Ref Range    WBC 9.6 4.8 - 10.8 K/uL    RBC 4.15 (L) 4.20 - 5.40 M/uL    Hemoglobin 11.8 (L) 12.0 - 16.0 g/dL    Hematocrit 35.6 (L) 37.0 - 47.0 %    MCV 85.8 81.4 - 97.8 fL    MCH 28.4 27.0 - 33.0 pg    MCHC 33.1 (L) 33.6 - 35.0 g/dL    RDW 37.6 35.9 - 50.0 fL    Platelet Count 282 164 - 446 K/uL    MPV 10.0 9.0 - 12.9 fL    Neutrophils-Polys 65.00 44.00 - 72.00 %    Lymphocytes 22.70 22.00 - 41.00 %    Monocytes 8.10 0.00 - 13.40 %    Eosinophils 3.00 0.00 - 6.90 %    Basophils 0.60 0.00 - 1.80 %    Immature Granulocytes 0.60 0.00 - 0.90 %    Nucleated RBC 0.00 /100 WBC    Neutrophils (Absolute) 6.21 2.00 - 7.15 K/uL    Lymphs (Absolute) 2.17 1.00 - 4.80 K/uL    Monos (Absolute) 0.77 0.00 - 0.85 K/uL    Eos (Absolute) 0.29 0.00 - 0.51 K/uL    Baso (Absolute) 0.06 0.00 - 0.12 K/uL    Immature Granulocytes (abs) 0.06 0.00 - 0.11 K/uL    NRBC (Absolute) 0.00 K/uL   BTYPE NATRIURETIC PEPTIDE   Result Value Ref Range    B Natriuretic Peptide 108 (H) 0 - 100 pg/mL   TROPONIN   Result Value Ref Range    Troponin I <0.01 0.00 - 0.04 ng/mL   ESTIMATED GFR   Result Value Ref Range    GFR If  21 (A) >60 mL/min/1.73 m 2    GFR If Non African American 17 (A) >60 mL/min/1.73 m 2   URINALYSIS,CULTURE IF INDICATED   Result Value Ref Range    Micro Urine Req  Microscopic     Color Colorless     Character Clear     Specific Gravity 1.004 <1.035    Ph 7.0 5.0-8.0    Glucose 300 (A) Negative mg/dL    Ketones Negative Negative mg/dL    Protein 200 (A) Negative mg/dL    Bilirubin Negative Negative    Nitrite Negative Negative    Leukocyte Esterase Negative Negative    Occult Blood Trace (A) Negative    Culture Indicated No UA Culture   URINE MICROSCOPIC (W/UA)   Result Value Ref Range    WBC 0-2 /hpf    RBC 0-2 /hpf    Bacteria Few (A) None /hpf    Epithelial Cells Few /hpf   URINE SODIUM RANDOM   Result Value Ref Range    Sodium, Urine -per volume 48 mmol/L   URINE CHLORIDE RANDOM   Result Value Ref Range    Chloride, Urine-per volume 45 mmol/L   URINE CREATININE RANDOM   Result Value Ref Range    Creatinine, Random Urine 28.00 mg/dL   URINE PROTEIN RANDOM   Result Value Ref Range    Total Protein, Urine 308.4 (H) 0.0 - 15.0 mg/dL   HEPATITIS PANEL ACUTE(4 COMPONENTS)   Result Value Ref Range    Hepatitis B Surface Antigen Negative Negative   COMPLEMENT C3   Result Value Ref Range    C3 Complement 141.0 87.0 - 200.0 mg/dL   COMPLEMENT C4   Result Value Ref Range    Complement C4 46.0 19.0 - 52.0 mg/dL         RADIOLOGY  US-RENAL   Final Result      No evidence of hydronephrosis or solid renal mass.      DX-CHEST-2 VIEWS   Final Result      Right lung base discoid atelectasis with no consolidation identified.      LE Venous Duplex-DVT ([facility] only)    (Results Pending)   ECHOCARDIOGRAM COMP W/O CONT    (Results Pending)         COURSE & MEDICAL DECISION MAKING  I have reviewed any medical record information, laboratory studies and radiographic results as noted above.  Differential diagnoses includes: CHF, anasarca, electrolyte abnormalities, anemia    Encounter Summary: This is a 61 y.o. female with presentation consistent with worsening fluid overload in the setting of chronic kidney disease, sent here emergently by her PCP but reviewing the records  reveals that her kidney function is in fact chronically decreased. She is currently in the middle of the evaluation by the cardiologist which will include an echocardiogram and a stress test, neither of which have been done yet. The exam is otherwise unremarkable, will check blood work including a chem panel and BNP, chest x-ray, administer medication for discomfort and then reevaluate ----- GFR continues to decrease, at the request of the hospitalist, I discussed the case with the nephrologist on call Dr. Warner who will consult on the patient. The patient has been admitted in guarded condition      DISPOSITION: Admitted in guarded condition      FINAL IMPRESSION  1. Anasarca associated with disorder of kidney           This dictation was created using voice recognition software. The accuracy of the dictation is limited to the abilities of the software. I expect there may be some errors of grammar and possibly content. The nursing notes were reviewed and certain aspects of this information were incorporated into this note.    Electronically signed by: Allen Price, 3/27/2017 2:27 PM

## 2017-03-27 NOTE — ED NOTES
Amb to triage, sent by her PMD for further eval.  Pt had labs drawn on 3/23 and was phoned today and told that her labs were abnormal, indicating kidney failure.  Pt c/o BLE swelling & mild sob.

## 2017-03-27 NOTE — IP AVS SNAPSHOT
" <p align=\"LEFT\"><IMG SRC=\"//[email or web address]$/[email or web address]\" alt=\"Image\" WIDTH=\"50%\" HEIGHT=\"200\" BORDER=\"\"></p>                   Name:Veda Ford  Medical Record Number:[id number]  CSN: [id number]    YOB: 1955   Age: 61 y.o.  Sex: female  HT:1.549 m (5' 0.98\") WT: 79.9 kg (176 lb 2.4 oz)          Admit Date: 3/27/2017     Discharge Date:   Today's Date: 4/1/2017  Attending Doctor:  Jason Adan M.D.                  Allergies:  Gabapentin          Your appointments     Apr 12, 2017 10:00 AM   Nm 59 with [facility] NM CARDIAC PET   [facility]    [address] [place] [id number]   [phone]            Apr 12, 2017  1:15 PM   [place] [facility] [place]AM [facility]   [place] [facility]    [address] [facility] [address]   [phone]           No prep            Jul 18, 2017  3:15 PM   FOLLOW UP with Lyly Leary M.D.   [facility] (--)    [address], [address]  [place] [id number] [phone]              Follow-up Information     1. Schedule an appointment as soon as possible for a visit with Remedios Warner M.D..    Specialty:  Nephrology    Why:  Kidney follow-up    Contact information    [address]  [address]  [place] [id number] [phone]           Medication List      Take these Medications        Instructions    amlodipine 10 MG Tabs   Commonly known as:  NORVASC    Take 1 Tab by mouth 2 Times a Day.   Dose:  10 mg       aspirin 325 MG Tabs   What changed:  Another medication with the same name was removed. Continue taking this medication, and follow the directions you see here.   Commonly known as:  ASA    Take 325 mg by mouth every 6 hours as needed for Mild Pain.   Dose:  325 mg       atorvastatin 40 MG Tabs   Commonly known as:  LIPITOR    Take 40 mg by mouth every evening.   Dose:  40 mg       carvedilol 6.25 MG Tabs   Commonly known as:  COREG    Take 1 Tab by mouth 2 times a " day, with meals.   Dose:  6.25 mg       clonidine 0.1 MG Tabs   Commonly known as:  CATAPRES    Take 0.1 mg by mouth every evening.   Dose:  0.1 mg       esomeprazole 40 MG delayed-release capsule   Commonly known as:  NEXIUM    Take 40 mg by mouth as needed (For heartburn).   Dose:  40 mg       glimepiride 4 MG Tabs   Commonly known as:  AMARYL    Take 4 mg by mouth every morning.   Dose:  4 mg       HUMALOG KWIKPEN 100 UNIT/ML Soln   Generic drug:  insulin lispro   Notes to Patient:  Hospital Sliding Scale  151-200: 2u,   201-250: 3u,   251-300: 5u,   301-350: 6u,   351-400: 8u,   over 400: 9u and call your doctor.    Inject 6-20 Units as instructed 3 times a day before meals. Sliding scale. If BS =3 units 131-199=6 units   Dose:  6-20 Units       insulin glargine 100 UNIT/ML Soln   Commonly known as:  LANTUS    Inject 40 Units as instructed 2 times a day.   Dose:  40 Units       loratadine 10 MG Tabs   Commonly known as:  CLARITIN    Take 10 mg by mouth every day.   Dose:  10 mg       sucralfate 1 GM Tabs   Commonly known as:  CARAFATE    Take 1 g by mouth 4 Times a Day,Before Meals and at Bedtime.   Dose:  1 g

## 2017-03-28 LAB
ANION GAP SERPL CALC-SCNC: 5 MMOL/L (ref 0–11.9)
BUN SERPL-MCNC: 31 MG/DL (ref 8–22)
CALCIUM SERPL-MCNC: 8.6 MG/DL (ref 8.5–10.5)
CHLORIDE SERPL-SCNC: 104 MMOL/L (ref 96–112)
CO2 SERPL-SCNC: 25 MMOL/L (ref 20–33)
CREAT SERPL-MCNC: 3.05 MG/DL (ref 0.5–1.4)
CRP SERPL HS-MCNC: 0.11 MG/DL (ref 0–0.75)
ERYTHROCYTE [SEDIMENTATION RATE] IN BLOOD BY WESTERGREN METHOD: 46 MM/HOUR (ref 0–30)
GFR SERPL CREATININE-BSD FRML MDRD: 15 ML/MIN/1.73 M 2
GLUCOSE BLD-MCNC: 107 MG/DL (ref 65–99)
GLUCOSE BLD-MCNC: 109 MG/DL (ref 65–99)
GLUCOSE BLD-MCNC: 118 MG/DL (ref 65–99)
GLUCOSE BLD-MCNC: 168 MG/DL (ref 65–99)
GLUCOSE BLD-MCNC: 176 MG/DL (ref 65–99)
GLUCOSE SERPL-MCNC: 221 MG/DL (ref 65–99)
LV EJECT FRACT  99904: 60
PHOSPHATE SERPL-MCNC: 4.2 MG/DL (ref 2.5–4.5)
POTASSIUM SERPL-SCNC: 4.9 MMOL/L (ref 3.6–5.5)
SODIUM SERPL-SCNC: 134 MMOL/L (ref 135–145)

## 2017-03-28 PROCEDURE — 770020 HCHG ROOM/CARE - TELE (206)

## 2017-03-28 PROCEDURE — 82962 GLUCOSE BLOOD TEST: CPT

## 2017-03-28 PROCEDURE — 93306 TTE W/DOPPLER COMPLETE: CPT

## 2017-03-28 PROCEDURE — 93306 TTE W/DOPPLER COMPLETE: CPT | Mod: 26 | Performed by: INTERNAL MEDICINE

## 2017-03-28 PROCEDURE — 93970 EXTREMITY STUDY: CPT

## 2017-03-28 PROCEDURE — 700111 HCHG RX REV CODE 636 W/ 250 OVERRIDE (IP): Performed by: HOSPITALIST

## 2017-03-28 PROCEDURE — 700102 HCHG RX REV CODE 250 W/ 637 OVERRIDE(OP): Performed by: HOSPITALIST

## 2017-03-28 PROCEDURE — 99233 SBSQ HOSP IP/OBS HIGH 50: CPT | Performed by: HOSPITALIST

## 2017-03-28 PROCEDURE — A9270 NON-COVERED ITEM OR SERVICE: HCPCS | Performed by: HOSPITALIST

## 2017-03-28 PROCEDURE — 93970 EXTREMITY STUDY: CPT | Mod: 26 | Performed by: SURGERY

## 2017-03-28 RX ORDER — CARVEDILOL 6.25 MG/1
6.25 TABLET ORAL 2 TIMES DAILY WITH MEALS
Status: DISCONTINUED | OUTPATIENT
Start: 2017-03-28 | End: 2017-04-01 | Stop reason: HOSPADM

## 2017-03-28 RX ORDER — ACETAMINOPHEN 325 MG/1
650 TABLET ORAL EVERY 6 HOURS PRN
Status: DISCONTINUED | OUTPATIENT
Start: 2017-03-28 | End: 2017-04-01 | Stop reason: HOSPADM

## 2017-03-28 RX ORDER — OXYCODONE HYDROCHLORIDE 5 MG/1
5 TABLET ORAL
Status: DISCONTINUED | OUTPATIENT
Start: 2017-03-28 | End: 2017-04-01 | Stop reason: HOSPADM

## 2017-03-28 RX ORDER — L. ACIDOPHILUS/L.BULGARICUS 100MM CELL
1 GRANULES IN PACKET (EA) ORAL
Status: DISCONTINUED | OUTPATIENT
Start: 2017-03-28 | End: 2017-04-01 | Stop reason: HOSPADM

## 2017-03-28 RX ORDER — OXYCODONE HYDROCHLORIDE 5 MG/1
2.5 TABLET ORAL
Status: DISCONTINUED | OUTPATIENT
Start: 2017-03-28 | End: 2017-04-01 | Stop reason: HOSPADM

## 2017-03-28 RX ADMIN — OXYCODONE HYDROCHLORIDE 5 MG: 5 TABLET ORAL at 21:23

## 2017-03-28 RX ADMIN — ONDANSETRON 4 MG: 2 INJECTION, SOLUTION INTRAMUSCULAR; INTRAVENOUS at 15:50

## 2017-03-28 RX ADMIN — INSULIN GLARGINE 40 UNITS: 100 INJECTION, SOLUTION SUBCUTANEOUS at 10:21

## 2017-03-28 RX ADMIN — LACTOBACILLUS ACIDOPHILUS / LACTOBACILLUS BULGARICUS 1 PACKET: 100 MILLION CFU STRENGTH GRANULES at 17:05

## 2017-03-28 RX ADMIN — HEPARIN SODIUM 5000 UNITS: 5000 INJECTION, SOLUTION INTRAVENOUS; SUBCUTANEOUS at 20:39

## 2017-03-28 RX ADMIN — STANDARDIZED SENNA CONCENTRATE AND DOCUSATE SODIUM 2 TABLET: 8.6; 5 TABLET, FILM COATED ORAL at 20:39

## 2017-03-28 RX ADMIN — INSULIN LISPRO 2 UNITS: 100 INJECTION, SOLUTION INTRAVENOUS; SUBCUTANEOUS at 11:49

## 2017-03-28 RX ADMIN — STANDARDIZED SENNA CONCENTRATE AND DOCUSATE SODIUM 2 TABLET: 8.6; 5 TABLET, FILM COATED ORAL at 08:43

## 2017-03-28 RX ADMIN — AMLODIPINE BESYLATE 10 MG: 10 TABLET ORAL at 09:00

## 2017-03-28 RX ADMIN — INSULIN GLARGINE 40 UNITS: 100 INJECTION, SOLUTION SUBCUTANEOUS at 20:42

## 2017-03-28 RX ADMIN — INSULIN LISPRO 2 UNITS: 100 INJECTION, SOLUTION INTRAVENOUS; SUBCUTANEOUS at 20:41

## 2017-03-28 RX ADMIN — OXYCODONE HYDROCHLORIDE 5 MG: 5 TABLET ORAL at 12:19

## 2017-03-28 RX ADMIN — ATORVASTATIN CALCIUM 40 MG: 40 TABLET, FILM COATED ORAL at 20:39

## 2017-03-28 RX ADMIN — OXYCODONE HYDROCHLORIDE 5 MG: 5 TABLET ORAL at 08:45

## 2017-03-28 RX ADMIN — FUROSEMIDE 40 MG: 10 INJECTION, SOLUTION INTRAVENOUS at 05:18

## 2017-03-28 RX ADMIN — LORATADINE 5 MG: 10 TABLET ORAL at 08:43

## 2017-03-28 RX ADMIN — CARVEDILOL 6.25 MG: 6.25 TABLET, FILM COATED ORAL at 17:05

## 2017-03-28 RX ADMIN — OXYCODONE HYDROCHLORIDE 5 MG: 5 TABLET ORAL at 03:04

## 2017-03-28 RX ADMIN — OMEPRAZOLE 40 MG: 20 CAPSULE, DELAYED RELEASE ORAL at 08:43

## 2017-03-28 RX ADMIN — HEPARIN SODIUM 5000 UNITS: 5000 INJECTION, SOLUTION INTRAVENOUS; SUBCUTANEOUS at 14:25

## 2017-03-28 RX ADMIN — HEPARIN SODIUM 5000 UNITS: 5000 INJECTION, SOLUTION INTRAVENOUS; SUBCUTANEOUS at 05:18

## 2017-03-28 RX ADMIN — ONDANSETRON 4 MG: 2 INJECTION, SOLUTION INTRAMUSCULAR; INTRAVENOUS at 11:47

## 2017-03-28 ASSESSMENT — ENCOUNTER SYMPTOMS
HEMOPTYSIS: 0
MYALGIAS: 0
NECK PAIN: 0
SHORTNESS OF BREATH: 0
DIARRHEA: 0
ABDOMINAL PAIN: 0
HEARTBURN: 0
FLANK PAIN: 0
SORE THROAT: 0
VOMITING: 0
PALPITATIONS: 0
ORTHOPNEA: 1
FOCAL WEAKNESS: 0
COUGH: 0
HEADACHES: 0
WHEEZING: 0
NAUSEA: 0
SENSORY CHANGE: 0
FEVER: 0
EYES NEGATIVE: 1
INSOMNIA: 1
DIZZINESS: 0
BACK PAIN: 0
CHILLS: 0

## 2017-03-28 ASSESSMENT — PAIN SCALES - GENERAL
PAINLEVEL_OUTOF10: 6
PAINLEVEL_OUTOF10: 10
PAINLEVEL_OUTOF10: 6
PAINLEVEL_OUTOF10: 8
PAINLEVEL_OUTOF10: 10

## 2017-03-28 NOTE — PROGRESS NOTES
Bedside report received. No overnight events.Patient A&O x 4. VS'S. RA. Complains of 10/10 abdominal pain described as intermittent sharp cramping will medicate per MAR. FSBG this morning 118. BLE edema 2+. BUN/CR elevated. Nephrologist Dr. Warner consulted per ED note. POC discussed with patient. Pt verbalizes understanding. Call light and belongings with in reach. Bed locked and in lowest position, alarm and fall precautions in place.

## 2017-03-28 NOTE — CONSULTS
DATE OF SERVICE:  03/27/2017    REASON FOR CONSULTATION:  To evaluate the patient with acute kidney injury on   chronic kidney disease, stage III to IV and proteinuria.    CONSULT AT THE REQUEST OF:  Allen Price MD.    HISTORY OF PRESENT ILLNESS:  The patient is a 61-year-old female with a   long-term history of hypertension, diabetes mellitus type 2 with diabetic   nephropathy with baseline creatinine level of 1.6-1.9 in 2015 then 2.23 at   stage IV in 2016 and current creatinine level is up to 2.78.  Urine protein   creatinine ratio is 3.8 g in December 2015, worsening up to 11 currently.  She   has positive family history for diabetes mellitus type 2.  Her mother was on   dialysis.  Also, 2 brothers with diabetes mellitus, younger brother passed   away from complications.  The patient was admitted to the hospital by primary   care doctor with worsening creatinine level.  Currently, patient is   complaining of worsening edema, abdominal cramping, also nausea, no vomiting,   no diarrhea, no difficulties to urinate.  No dysuria or hematuria.  Positive   for occasional dyspnea on exertion, no cough, no hemoptysis.    REVIEW OF SYSTEMS:  All 14 points reveal negative except history of present   illness per CMS and AMA criteria.    PAST MEDICAL HISTORY:  Positive for diabetes mellitus type 2, hypertension,   hyperlipidemia, chronic kidney disease stage III to IV, gastrointestinal   bleeding, anemia, colitis, hypothyroidism, peripheral neuropathy, and   gastroesophageal reflux disease.    PAST SURGICAL HISTORY:  Cataract surgery.    ALLERGIES:  ALLERGIC TO GABAPENTIN.    OUTPATIENT MEDICATIONS:  Reviewed in the chart.    FAMILY HISTORY:  Positive for hypertension, diabetes mellitus type 2, and   end-stage renal disease.    PHYSICAL EXAMINATION:  VITAL SIGNS:  Blood pressure 148/77, heart rate 70, and temperature 36.3   Celsius.  GENERAL APPEARANCE:  A well-developed, well-nourished female, in no acute    distress.  HEENT:  Normocephalic and atraumatic.  Pupils are equal, round, and reactive   to light.  Extraocular movements are intact.  Nares are patent.  Oropharynx is   clear, moist mucosa, no erythema or exudate.  NECK:  Supple, no lymphadenopathy, no thyromegaly appreciated.  LUNGS:  Clear to auscultation bilaterally.  No rales, wheezes, or rhonchi.  HEART:  Regular rate.  No rub or gallop.  ABDOMEN:  Soft, nontender, and nondistended.  Bowel sounds are present.  EXTREMITIES:  1+ edema bilaterally.  No cyanosis.  NEUROLOGIC:  Alert and oriented x3.  No focal deficit.  Cranial nerves II   through XII are grossly intact.    LABORATORY RESULTS:  Reviewed, revealed a hemoglobin level of 11.8 and   platelets 282.  Sodium 133, potassium 5.0, CO2 of 19, BUN of 32 and creatinine   level 2.78.    Renal ultrasound, no evidence of hydronephrosis or a solid mass.    ASSESSMENT AND PLAN:  The patient is a 61-year-old female with worsening serum   creatinine level, admitted with abnormal laboratory tests.  1.  Acute kidney injury on chronic kidney disease, stage III to IV with   possible progression, to monitor basic metabolic panel closely.  2.  Proteinuria, nephrotic range, possibly due to diabetic nephropathy.  Also,   we will complete serology to rule out glomerulonephritis.  3.  Hypertension.  Blood pressure remains at the acceptable range, to monitor   closely.  4.  Volume, to check brain natriuretic peptide, which is within normal limits   at 108.  5.  Edema, likely secondary to nephrotic syndrome.  6.  Anemia, to monitor.  7.  Electrolytes remain well controlled.    RECOMMENDATIONS:  Close monitoring of basic metabolic panel.  No need for   emergent dialysis at the present time.  To complete serology, rule out   glomerulonephritis.  We will follow up patient closely.    Thank you for the consult.       ____________________________________     MD REJI ERNST / JESENIA    DD:  03/27/2017 18:29:08  DT:  03/27/2017  19:58:51    D#:  [id number]  Job#:  [id number]

## 2017-03-28 NOTE — PROGRESS NOTES
Report received from ED RN. Assumed care of patient,  Patient is A&O4. Patient has been updated on POC, all questions answered. Patient denies further needs at this time. Encouraged patient to call with questions or concerns, call light within reach. Pt is currently in pain, will medicate per MAR

## 2017-03-28 NOTE — DISCHARGE PLANNING
"Care Transition Team Assessment    Completed screening and pt stated that she lives with her monster and that \"he had back surgery last year so she has been taking care of him and probably should have been taking better care of herself\". She does not feel that she needs home health at the moment but if she feels she is unable to care for herself post discharge she will communicate that to the care team.     Information Source  Orientation : Oriented x 4  Information Given By: Patient  Informant's Name: Veda  Who is responsible for making decisions for patient? : Patient    Readmission Evaluation  Is this a readmission?: Yes - unplanned readmission  Why do you think you were readmitted?: Gallstones  Was an appointment arranged for you prior to discharge?: Yes, attended appointment  Were there new prescriptions you were supposed to fill after you were discharged?: Yes, prescriptions filled  Did you understand your discharge instructions?: Yes  Did you have enough support after your last discharge?: No  Please explain: Pt stated that she lives with her monster and he had back surgery last year so she has been taking care of him and probably should have been taking better care of herself.    Elopement Risk  Legal Hold: No  Ambulatory or Self Mobile in Wheelchair: Yes  Disoriented: No  Psychiatric Symptoms: None  History of Wandering: No  Elopement this Admit: No  Vocalizing Wanting to Leave: No  Displays Behaviors, Body Language Wanting to Leave: No-Not at Risk for Elopement  Elopement Risk: Not at Risk for Elopement    Interdisciplinary Discharge Planning  Does Admitting Nurse Feel This Could be a Complex Discharge?: No  Primary Care Physician: Dr. Jesika Reese  Lives with - Patient's Self Care Capacity:  (Fiance)  Patient or legal guardian wants to designate a caregiver (see row info): No  Support Systems: Family Member(s)  Housing / Facility: 1 Story Apartment / Condo  Do You Take your Prescribed Medications " Regularly: Yes  Able to Return to Previous ADL's:  (Pt unsure)  Mobility Issues: No  Prior Services: None  Patient Expects to be Discharged to:: Home  Assistance Needed: No  Durable Medical Equipment: Not Applicable    Discharge Preparedness  What is your plan after discharge?: Home with help  What are your discharge supports?:  (Patient monster, however she has been taking care of him after his back surgery last year)  Prior Functional Level: Ambulatory, Drives Self, Independent with Activities of Daily Living, Independent with Medication Management  Difficulity with ADLs: None  Difficulity with IADLs: Driving  Difficulity with IADL Comments: Pt states that she lives close to her physician but she can drive if necessary she just prefers not to    Functional Assesment  Prior Functional Level: Ambulatory, Drives Self, Independent with Activities of Daily Living, Independent with Medication Management    Finances  Financial Barriers to Discharge: No  Prescription Coverage: Yes ([employer] on [address])    Vision / Hearing Impairment  Vision Impairment : No  Hearing Impairment : No    Values / Beliefs / Concerns  Values / Beliefs Concerns : No         Domestic Abuse  Have you ever been the victim of abuse or violence?: No  Physical Abuse or Sexual Abuse: No  Verbal Abuse or Emotional Abuse: No  Possible Abuse Reported to:: Not Applicable    Psychological Assessment  History of Substance Abuse: None  History of Psychiatric Problems: No  Non-compliant with Treatment: No  Newly Diagnosed Illness: No    Discharge Risks or Barriers  Discharge risks or barriers?:  (Lack of support at home)  Patient risk factors: Readmission    Anticipated Discharge Information  Anticipated discharge disposition: Home  Discharge Address: [address] [place] [address] [place] [id number]  Discharge Contact Phone Number: [phone]

## 2017-03-28 NOTE — H&P
CHIEF COMPLAINT:  Dyspnea and edema.      HISTORY OF PRESENT ILLNESS:  Patient is a 61-year-old female with history of   diabetes, chronic kidney disease stage III, who was directed to the emergency   room by her primary care provider as she was noted to have worsening renal   failure.  She has been having edema that progressively got worse over the past   few weeks associated with orthopnea and progressive dyspnea on exertion.  She   was evaluated as an outpatient by Dr. Leary from cardiology, was planning on   doing an echocardiogram and a PET scan stress test to assess for coronary   ischemia and those are scheduled for later this month.  She denies any chest   pain at this time.  She denies any headache.  She complains of generalized   pain in her hand and shoulder and back and knees.  She reports that she has   been told she has arthritis, but she is not sure what type.  She denies any   fever or chills.      REVIEW OF SYSTEMS:  As above, otherwise reviewed and negative.      PAST MEDICAL HISTORY:  Significant for type 2 diabetes, hypertension,   dyslipidemia, chronic kidney disease, history of peripheral neuropathy,   hypothyroidism, GERD, sleep apnea, positive hep B serology.      PAST SURGICAL HISTORY:  EGD, colonoscopy, cholecystectomy.      SOCIAL HISTORY:  She does not smoke.  She drinks alcohol rarely.  No illicit   drug use.      FAMILY HISTORY:  Positive for diabetes.      HOME MEDICATIONS:  Amlodipine 10 mg b.i.d., aspirin 325 mg as needed and   aspirin 81 mg daily, atorvastatin 40 mg daily, clonidine 0.1 mg every evening,   Nexium 40 mg as needed, Amaryl 4 mg daily, Lantus 40 units b.i.d., Humalog   sliding scale insulin, loratadine 10 mg daily, losartan 100 mg daily and   Carafate 1 g 4 times a day.      PHYSICAL EXAMINATION:    GENERAL:  She is alert, oriented x3.    VITAL SIGNS:  Temperature is 36.3, pulse is 81, respiratory rate is 16, blood   pressure was 128/66 and pulse oximetry is 97%.    HEAD  AND NECK:  Pupils equal.  Supple neck.  Has increased JVP.  Oropharynx is   clear.  No cervical lymphadenopathy.    HEART:  Regular rate and rhythm.  Normal S1, S2.  No murmurs, rubs or gallops.      LUNGS:  Clear with symmetric air entry bilaterally.    CHEST:  No chest wall tenderness.    ABDOMEN:  Soft, nontender.  Bowel sounds are positive.  No hepatosplenomegaly.      EXTREMITIES:  She has 2+ edema, no clubbing, no cyanosis.    NEUROLOGIC:  No focal deficits.    SKIN:  No rash.      DIAGNOSTICS:  White blood cell count is 9.6, hemoglobin 11.8, hematocrit 35.6   and platelet count _____.  Sodium 133, potassium is 5.0, chloride 107,   bicarbonate 19, glucose _____, BUN 32, creatinine 2.78 and alkaline   phosphatase 125.  LFTs, otherwise normal.  Troponin I less than 0.01.  BNP is   108.  Urinalysis reveals 200 protein, trace occult blood, 0-2 white blood   cells.      Total urine protein is 308.  Complement C4 level was 46, C3 level was 141.      IMAGING:  Chest x-ray revealed right lung base discoid atelectasis with no   consolidation identified.  Renal ultrasound revealed no evidence of   hydronephrosis or solid renal mass.      ASSESSMENT AND PLAN:    1.  Anasarca.    2.  Acute kidney injury on chronic kidney disease.    3.  Proteinuria.    4.  Hypertension.    5.  Type 2 diabetes.    6.  Hyponatremia.    7.  Atypical chest pain prior to presentation.      PLAN:  The patient will be admitted and monitored on telemetry.      We will start her on IV Lasix.      Given her worsening renal function, we will hold off on her losartan for now,   we will continue with her amlodipine.  We will monitor her blood pressure and   adjust her medications accordingly.  We will continue with her Lantus and   sliding scale insulin and monitor her blood sugars and adjust accordingly.      We will check an echocardiogram.      We will consult nephrology, Dr. Warner, has been notified by the emergency room   physician.  We will await  for her evaluation and recommendations.  We will   check sed rate, LEANA and RA titer.      Plan of care reviewed with the patient and her .  Their questions   answered.    We will start on heparin for deep venous thrombosis prophylaxis.       ____________________________________     MD LC GRIFFIN / JESENIA    DD:  03/27/2017 21:06:30  DT:  03/27/2017 23:36:57    D#:  [id number]  Job#:  [id number]

## 2017-03-28 NOTE — PROGRESS NOTES
Hospital Medicine Progress Note, Adult, Complex               Author: Everett Olguin Date & Time created: 3/28/2017  3:18 PM     Interval History:  Admitted for ARF, edema, uncontrolled DM.  Pt's feeling better.  Having some abd pain.      Review of Systems:  Review of Systems   Constitutional: Negative for fever and chills.   Respiratory: Negative for cough and shortness of breath.    Cardiovascular: Positive for leg swelling. Negative for chest pain and palpitations.   Gastrointestinal: Negative for nausea and vomiting.   Psychiatric/Behavioral: The patient has insomnia.    All other systems reviewed and are negative.      Physical Exam:  Physical Exam  Nursing note and vitals reviewed.  Constitutional: She is oriented to person, place, and time. She appears well-developed and well-nourished overweight.   HENT:   Head: Normocephalic and atraumatic.   Right Ear: External ear normal.   Left Ear: External ear normal.   Nose: Nose normal.   Mouth/Throat: Oropharynx is small with Mallanpati score of 4.  Mucosa is clear and moist.   Eyes: Conjunctivae and extraocular motions are normal. Pupils are equal, round, and reactive to light.   Neck: Normal range of motion. Neck supple.   Cardiovascular: Normal rate, regular rhythm, normal heart sounds and intact distal pulses.  +2-3 BLEE.  Pulmonary/Chest: Effort normal and breath sounds normal.   Abdominal: Soft. Bowel sounds are normal.   Musculoskeletal: Normal range of motion.   Neurological: She is alert and oriented to person, place, and time.   Skin: Skin is warm and dry.       Labs:        Invalid input(s): [id number]  Recent Labs      03/27/17   1345   TROPONINI  <0.01   BNPBTYPENAT  108*     Recent Labs      03/27/17   1345  03/27/17   2315   SODIUM  133*  134*   POTASSIUM  5.0  4.9   CHLORIDE  107  104   CO2  19*  25   BUN  32*  31*   CREATININE  2.78*  3.05*   PHOSPHORUS   --   4.2   CALCIUM  8.6  8.6     Recent Labs      03/27/17   1345  03/27/17   2315   ALTSGPT   15   --    ASTSGOT  16   --    ALKPHOSPHAT  125*   --    TBILIRUBIN  0.3   --    GLUCOSE  246*  221*     Recent Labs      17   1345  17   2315   RBC  4.15*  3.85*   HEMOGLOBIN  11.8*  10.8*   HEMATOCRIT  35.6*  33.4*   PLATELETCT  282  265     Recent Labs      17   1345  17   2315   WBC  9.6  8.2   NEUTSPOLYS  65.00  66.70   LYMPHOCYTES  22.70  20.80*   MONOCYTES  8.10  8.40   EOSINOPHILS  3.00  3.20   BASOPHILS  0.60  0.50   ASTSGOT  16   --    ALTSGPT  15   --    ALKPHOSPHAT  125*   --    TBILIRUBIN  0.3   --            Hemodynamics:  Temp (24hrs), Av.6 °C (97.9 °F), Min:36.2 °C (97.2 °F), Max:37.2 °C (99 °F)  Temperature: 36.7 °C (98.1 °F)  Pulse  Av.1  Min: 65  Max: 81Heart Rate (Monitored): 67  Blood Pressure: 129/77 mmHg, NIBP: 152/74 mmHg     Respiratory:    Respiration: 16, Pulse Oximetry: 95 %        RUL Breath Sounds: Clear, RML Breath Sounds: Diminished, RLL Breath Sounds: Diminished, KHRIS Breath Sounds: Clear, LLL Breath Sounds: Diminished  Fluids:    Intake/Output Summary (Last 24 hours) at 17 1518  Last data filed at 17 0400   Gross per 24 hour   Intake    240 ml   Output      0 ml   Net    240 ml     Weight: 89.1 kg (196 lb 6.9 oz)  GI/Nutrition:  Orders Placed This Encounter   Procedures   • Diet Order     Standing Status: Standing      Number of Occurrences: 1      Standing Expiration Date:      Order Specific Question:  Diet:     Answer:  Diabetic [3]     Order Specific Question:  Diet:     Answer:  Renal [8]     Medical Decision Making, by Problem:  Active Hospital Problems    Diagnosis   • Anasarca associated with disorder of kidney [N04.9] - creatinine increasing.  Follow c IVF and med changes.  Check echo.   CASTRO - DVT eval and if neg MARIE/SCD's.  HTN - better.  CCR and follow.  Obesity - encourage decreased Kcal diet.  Hyponatremia - mild.  Follow c fluids.  DM 2 - uncontrolled.  Home regimen and cover c ISS.  CKD 3/ARF - worsening.  Neph on.  FRANK -  encourage CPAP compliance.  Hep C - outpatient follow up.  Insomnia - outpatient follow up.  Stable issues - med hx (HTN, HLD, GERD, hypothyroid),  Preventives - IS, Vax, stool soft, DVTP, Lactinex.  Dispo - complex/guarded.      EKG reviewed, Radiology images reviewed, Labs reviewed and Medications reviewed  Jhaveri catheter: No Jhaveri      DVT Prophylaxis: Heparin        Assessed for rehab: Patient unable to tolerate rehabilitation therapeutic regimen

## 2017-03-28 NOTE — PROGRESS NOTES
Hospital Medicine Progress Note, Adult, Complex               Author: Remedios Timmy Date & Time created: 3/28/2017  3:19 PM     Interval History:  60 y/o female with hx/of HTN, DM II, CKD III, proteinuria  Admitted with worsening renal function, significant nephrotic range proteinuria  Doing better  Improved SOB, edema  No N/V  Plan for renal biopsy    Review of Systems:  Review of Systems   Constitutional: Positive for malaise/fatigue. Negative for fever and chills.   HENT: Negative for congestion, nosebleeds and sore throat.    Eyes: Negative.    Respiratory: Negative for cough, hemoptysis and wheezing.    Cardiovascular: Positive for orthopnea and leg swelling. Negative for chest pain and palpitations.   Gastrointestinal: Negative for heartburn, nausea, vomiting, abdominal pain and diarrhea.   Genitourinary: Negative for dysuria, urgency, frequency, hematuria and flank pain.   Musculoskeletal: Negative for myalgias, back pain, joint pain and neck pain.   Skin: Negative for itching and rash.   Neurological: Negative for dizziness, sensory change, focal weakness and headaches.       Physical Exam:  Physical Exam   Constitutional: She is oriented to person, place, and time. She appears well-developed and well-nourished. No distress.   HENT:   Head: Normocephalic and atraumatic.   Mouth/Throat: Oropharynx is clear and moist.   Eyes: Conjunctivae and EOM are normal. Pupils are equal, round, and reactive to light.   Neck: Normal range of motion. Neck supple.   Cardiovascular: Normal rate and regular rhythm.  Exam reveals no gallop and no friction rub.    Pulmonary/Chest: Effort normal and breath sounds normal. No respiratory distress. She has no wheezes. She has no rales.   Abdominal: Soft. Bowel sounds are normal. She exhibits no distension and no mass. There is no tenderness.   Musculoskeletal: She exhibits edema.   Neurological: She is alert and oriented to person, place, and time. No cranial nerve deficit.  Coordination normal.   Skin: Skin is warm. No rash noted. No erythema.   Nursing note and vitals reviewed.      Labs:        Invalid input(s): [id number]  Recent Labs      17   134   TROPONINI  <0.01   BNPBTYPENAT  108*     Recent Labs      17   1345  17   2315   SODIUM  133*  134*   POTASSIUM  5.0  4.9   CHLORIDE  107  104   CO2  19*  25   BUN  32*  31*   CREATININE  2.78*  3.05*   PHOSPHORUS   --   4.2   CALCIUM  8.6  8.6     Recent Labs      17   1345  17   2315   ALTSGPT  15   --    ASTSGOT  16   --    ALKPHOSPHAT  125*   --    TBILIRUBIN  0.3   --    GLUCOSE  246*  221*     Recent Labs      17   1345  17   231   RBC  4.15*  3.85*   HEMOGLOBIN  11.8*  10.8*   HEMATOCRIT  35.6*  33.4*   PLATELETCT  282  265     Recent Labs      17   1345  17   2315   WBC  9.6  8.2   NEUTSPOLYS  65.00  66.70   LYMPHOCYTES  22.70  20.80*   MONOCYTES  8.10  8.40   EOSINOPHILS  3.00  3.20   BASOPHILS  0.60  0.50   ASTSGOT  16   --    ALTSGPT  15   --    ALKPHOSPHAT  125*   --    TBILIRUBIN  0.3   --            Hemodynamics:  Temp (24hrs), Av.6 °C (97.9 °F), Min:36.2 °C (97.2 °F), Max:37.2 °C (99 °F)  Temperature: 36.7 °C (98.1 °F)  Pulse  Av.1  Min: 65  Max: 81Heart Rate (Monitored): 67  Blood Pressure: 129/77 mmHg, NIBP: 152/74 mmHg     Respiratory:    Respiration: 16, Pulse Oximetry: 95 %        RUL Breath Sounds: Clear, RML Breath Sounds: Diminished, RLL Breath Sounds: Diminished, KHRIS Breath Sounds: Clear, LLL Breath Sounds: Diminished  Fluids:    Intake/Output Summary (Last 24 hours) at 17 1519  Last data filed at 17 0400   Gross per 24 hour   Intake    240 ml   Output      0 ml   Net    240 ml     Weight: 89.1 kg (196 lb 6.9 oz)  GI/Nutrition:  Orders Placed This Encounter   Procedures   • Diet Order     Standing Status: Standing      Number of Occurrences: 1      Standing Expiration Date:      Order Specific Question:  Diet:     Answer:  Diabetic  [3]     Order Specific Question:  Diet:     Answer:  Renal [8]     Medical Decision Making, by Problem:  Active Hospital Problems    Diagnosis   • Anasarca associated with disorder of kidney [N04.9]       Labs reviewed and Medications reviewed                  Assessment and plan    1.DOTTIE/CKD III-IV -worsening creat level    With nephrotic range proteinuria (DMN vs GN)  2.HTN: BP well controlled  3.Electrolytes: well controlled.  4.Anemia: Hb to montior  5.Volume:BNP WNL -d/c lasix       Recs: low Na diet             Avoid nephrotoxic agents             F/u serology results             Diagnostic kidney biopsy

## 2017-03-29 PROBLEM — N17.9 RENAL FAILURE (ARF), ACUTE ON CHRONIC (HCC): Status: ACTIVE | Noted: 2017-03-29

## 2017-03-29 PROBLEM — N18.9 RENAL FAILURE (ARF), ACUTE ON CHRONIC (HCC): Status: ACTIVE | Noted: 2017-03-29

## 2017-03-29 LAB
ANCA IGG TITR SER IF: NORMAL {TITER}
ANION GAP SERPL CALC-SCNC: 6 MMOL/L (ref 0–11.9)
APTT PPP: 55.3 SEC (ref 24.7–36)
BUN SERPL-MCNC: 31 MG/DL (ref 8–22)
CALCIUM SERPL-MCNC: 8.4 MG/DL (ref 8.5–10.5)
CHLORIDE SERPL-SCNC: 106 MMOL/L (ref 96–112)
CO2 SERPL-SCNC: 23 MMOL/L (ref 20–33)
CREAT SERPL-MCNC: 3.55 MG/DL (ref 0.5–1.4)
GFR SERPL CREATININE-BSD FRML MDRD: 13 ML/MIN/1.73 M 2
GLUCOSE BLD-MCNC: 101 MG/DL (ref 65–99)
GLUCOSE BLD-MCNC: 106 MG/DL (ref 65–99)
GLUCOSE BLD-MCNC: 122 MG/DL (ref 65–99)
GLUCOSE BLD-MCNC: 66 MG/DL (ref 65–99)
GLUCOSE BLD-MCNC: 69 MG/DL (ref 65–99)
GLUCOSE BLD-MCNC: 73 MG/DL (ref 65–99)
GLUCOSE BLD-MCNC: 80 MG/DL (ref 65–99)
GLUCOSE BLD-MCNC: 82 MG/DL (ref 65–99)
GLUCOSE SERPL-MCNC: 94 MG/DL (ref 65–99)
HCV RNA SERPL NAA+PROBE-ACNC: <15 IU/ML
HCV RNA SERPL NAA+PROBE-LOG IU: <1.2 LOG IU
HCV RNA SERPL QL NAA+PROBE: NOT DETECTED
INR PPP: 1 (ref 0.87–1.13)
NUCLEAR IGG SER QL IA: NORMAL
NUCLEAR IGG SER QL IA: NORMAL
PATHOLOGY STUDY: NORMAL
PHOSPHATE SERPL-MCNC: 5.4 MG/DL (ref 2.5–4.5)
POTASSIUM SERPL-SCNC: 4.3 MMOL/L (ref 3.6–5.5)
PROTHROMBIN TIME: 13.5 SEC (ref 12–14.6)
SODIUM SERPL-SCNC: 135 MMOL/L (ref 135–145)

## 2017-03-29 PROCEDURE — 85610 PROTHROMBIN TIME: CPT

## 2017-03-29 PROCEDURE — A9270 NON-COVERED ITEM OR SERVICE: HCPCS | Performed by: HOSPITALIST

## 2017-03-29 PROCEDURE — 700102 HCHG RX REV CODE 250 W/ 637 OVERRIDE(OP): Performed by: HOSPITALIST

## 2017-03-29 PROCEDURE — 82962 GLUCOSE BLOOD TEST: CPT | Mod: 91

## 2017-03-29 PROCEDURE — 700111 HCHG RX REV CODE 636 W/ 250 OVERRIDE (IP): Performed by: HOSPITALIST

## 2017-03-29 PROCEDURE — 85730 THROMBOPLASTIN TIME PARTIAL: CPT

## 2017-03-29 PROCEDURE — 770006 HCHG ROOM/CARE - MED/SURG/GYN SEMI*

## 2017-03-29 PROCEDURE — 700101 HCHG RX REV CODE 250: Performed by: HOSPITALIST

## 2017-03-29 PROCEDURE — 84100 ASSAY OF PHOSPHORUS: CPT

## 2017-03-29 PROCEDURE — 36415 COLL VENOUS BLD VENIPUNCTURE: CPT

## 2017-03-29 PROCEDURE — 80048 BASIC METABOLIC PNL TOTAL CA: CPT

## 2017-03-29 PROCEDURE — 99233 SBSQ HOSP IP/OBS HIGH 50: CPT | Performed by: HOSPITALIST

## 2017-03-29 RX ADMIN — LACTOBACILLUS ACIDOPHILUS / LACTOBACILLUS BULGARICUS 1 PACKET: 100 MILLION CFU STRENGTH GRANULES at 17:09

## 2017-03-29 RX ADMIN — OXYCODONE HYDROCHLORIDE 5 MG: 5 TABLET ORAL at 17:10

## 2017-03-29 RX ADMIN — STANDARDIZED SENNA CONCENTRATE AND DOCUSATE SODIUM 2 TABLET: 8.6; 5 TABLET, FILM COATED ORAL at 21:12

## 2017-03-29 RX ADMIN — CARVEDILOL 6.25 MG: 6.25 TABLET, FILM COATED ORAL at 08:13

## 2017-03-29 RX ADMIN — CARVEDILOL 6.25 MG: 6.25 TABLET, FILM COATED ORAL at 17:10

## 2017-03-29 RX ADMIN — ATORVASTATIN CALCIUM 40 MG: 40 TABLET, FILM COATED ORAL at 21:11

## 2017-03-29 RX ADMIN — OMEPRAZOLE 40 MG: 20 CAPSULE, DELAYED RELEASE ORAL at 08:13

## 2017-03-29 RX ADMIN — INSULIN GLARGINE 40 UNITS: 100 INJECTION, SOLUTION SUBCUTANEOUS at 21:16

## 2017-03-29 RX ADMIN — LACTOBACILLUS ACIDOPHILUS / LACTOBACILLUS BULGARICUS 1 PACKET: 100 MILLION CFU STRENGTH GRANULES at 11:53

## 2017-03-29 RX ADMIN — HEPARIN SODIUM 5000 UNITS: 5000 INJECTION, SOLUTION INTRAVENOUS; SUBCUTANEOUS at 06:00

## 2017-03-29 RX ADMIN — OXYCODONE HYDROCHLORIDE 5 MG: 5 TABLET ORAL at 21:11

## 2017-03-29 RX ADMIN — DEXTROSE MONOHYDRATE 25 ML: 500 INJECTION PARENTERAL at 08:09

## 2017-03-29 RX ADMIN — POLYETHYLENE GLYCOL 3350 1 PACKET: 17 POWDER, FOR SOLUTION ORAL at 11:55

## 2017-03-29 RX ADMIN — INSULIN GLARGINE 40 UNITS: 100 INJECTION, SOLUTION SUBCUTANEOUS at 08:58

## 2017-03-29 RX ADMIN — STANDARDIZED SENNA CONCENTRATE AND DOCUSATE SODIUM 2 TABLET: 8.6; 5 TABLET, FILM COATED ORAL at 08:13

## 2017-03-29 ASSESSMENT — ENCOUNTER SYMPTOMS
BACK PAIN: 0
SENSORY CHANGE: 0
HEARTBURN: 0
NAUSEA: 0
ABDOMINAL PAIN: 0
SORE THROAT: 0
SHORTNESS OF BREATH: 0
FOCAL WEAKNESS: 0
MYALGIAS: 0
FEVER: 0
EYES NEGATIVE: 1
COUGH: 0
ORTHOPNEA: 1
FLANK PAIN: 0
WHEEZING: 0
INSOMNIA: 1
HEMOPTYSIS: 0
DIZZINESS: 0
PALPITATIONS: 0
CHILLS: 0
VOMITING: 0
NECK PAIN: 0
HEADACHES: 0
DIARRHEA: 0

## 2017-03-29 ASSESSMENT — PAIN SCALES - GENERAL
PAINLEVEL_OUTOF10: 0
PAINLEVEL_OUTOF10: 0
PAINLEVEL_OUTOF10: 8
PAINLEVEL_OUTOF10: 0

## 2017-03-29 NOTE — PROGRESS NOTES
Hospital Medicine Progress Note, Adult, Complex               Author: Everett Olguin Date & Time created: 3/29/2017  11:53 AM     Interval History:  Admitted for ARF, edema, uncontrolled DM.  Pt's feeling better.  Renal biopsy is deferred for tomorrow.     Review of Systems:  Review of Systems   Respiratory: Negative for cough and shortness of breath.    Cardiovascular: Positive for leg swelling. Negative for chest pain and palpitations.   Gastrointestinal: Negative for nausea.   Psychiatric/Behavioral: The patient has insomnia.    All other systems reviewed and are negative.      Physical Exam:  Physical Exam  Nursing note and vitals reviewed.  Constitutional: She is oriented to person, place, and time. She appears well-developed and well-nourished overweight.   HENT:   Head: Normocephalic and atraumatic.   Right Ear: External ear normal.   Left Ear: External ear normal.   Nose: Nose normal.   Eyes: Conjunctivae and extraocular motions are normal.    Neck: Normal range of motion. Neck supple.   Cardiovascular: Normal rate.    Pulmonary/Chest: Effort normal.   Abdominal: Soft. Bowel sounds are normal.   Musculoskeletal: Normal range of motion.   Neurological: She is alert and oriented to person, place, and time.   Skin: Skin is warm and dry.       .           Labs:        Invalid input(s): [id number]  Recent Labs      03/27/17   1345   TROPONINI  <0.01   BNPBTYPENAT  108*     Recent Labs      03/27/17   1345  03/27/17 2315  03/29/17   0402   SODIUM  133*  134*  135   POTASSIUM  5.0  4.9  4.3   CHLORIDE  107  104  106   CO2  19*  25  23   BUN  32*  31*  31*   CREATININE  2.78*  3.05*  3.55*   PHOSPHORUS   --   4.2  5.4*   CALCIUM  8.6  8.6  8.4*     Recent Labs      03/27/17   1345  03/27/17   2315  03/29/17   0402   ALTSGPT  15   --    --    ASTSGOT  16   --    --    ALKPHOSPHAT  125*   --    --    TBILIRUBIN  0.3   --    --    GLUCOSE  246*  221*  94     Recent Labs      03/27/17   1345  03/27/17 2315   17   0402   RBC  4.15*  3.85*   --    HEMOGLOBIN  11.8*  10.8*   --    HEMATOCRIT  35.6*  33.4*   --    PLATELETCT  282  265   --    PROTHROMBTM   --    --   13.5   APTT   --    --   55.3*   INR   --    --   1.00     Recent Labs      17   1345  17   2315   WBC  9.6  8.2   NEUTSPOLYS  65.00  66.70   LYMPHOCYTES  22.70  20.80*   MONOCYTES  8.10  8.40   EOSINOPHILS  3.00  3.20   BASOPHILS  0.60  0.50   ASTSGOT  16   --    ALTSGPT  15   --    ALKPHOSPHAT  125*   --    TBILIRUBIN  0.3   --            Hemodynamics:  Temp (24hrs), Av.2 °C (98.9 °F), Min:36.7 °C (98.1 °F), Max:37.7 °C (99.8 °F)  Temperature:  (medical)  Pulse  Av.2  Min: 65  Max: 81   Blood Pressure:  (medical)     Respiratory:    Respiration: 16, Pulse Oximetry:  (medical)        RUL Breath Sounds: Clear, RML Breath Sounds: Diminished, RLL Breath Sounds: Diminished, KHRIS Breath Sounds: Clear, LLL Breath Sounds: Diminished  Fluids:  No intake or output data in the 24 hours ending 17 1153  Weight: 81.2 kg (179 lb 0.2 oz)  GI/Nutrition:  Orders Placed This Encounter   Procedures   • DIET ORDER     Standing Status: Standing      Number of Occurrences: 1      Standing Expiration Date:      Order Specific Question:  Diet:     Answer:  Diabetic [3]   • DIET NPO     Standing Status: Standing      Number of Occurrences: 8      Standing Expiration Date:      Order Specific Question:  Restrict to:     Answer:  Sips with Medications [3]     Medical Decision Making, by Problem:  Active Hospital Problems    Diagnosis   • Anasarca associated with disorder of kidney [N04.9] - creatinine increasing.  Follow c IVF and med changes.  Echo is WNL.    CASTRO - DVT eval neg.  MARIE/SCD's.  Obesity - encourage decreased Kcal diet.  DM 2 - uncontrolled.  Home regimen and cover c ISS.  CKD 3/ARF - worsening.  Renal biopsy pending.  Neph on.  FRANK - encourage CPAP compliance.  Hep C - outpatient follow up.  Insomnia - outpatient follow up.  Stable issues -  med hx (HTN, HLD, GERD, hypothyroid), preventives, hyponatremia.  Dispo - complex/guarded.  Transfer to med floor.      Labs reviewed and Medications reviewed  Jhaveri catheter: No Jhaveri      DVT Prophylaxis: Heparin        Assessed for rehab: Patient unable to tolerate rehabilitation therapeutic regimen

## 2017-03-29 NOTE — PROGRESS NOTES
The interventional radiology procedure Renal Biopsy has been tentatively scheduled for tomorrow 3/30/17 pending heparin held for safety heparin needs to be held 24 hours prior to procedure.  Please update patient and family to plan of care.  Notified Ap bedside RN of pre procedure needs, NPO, Consent for Radiology Procedure, IV, current labs to include PT/INR, anti-coagulants held.  Any questions please call [phone].

## 2017-03-29 NOTE — PROGRESS NOTES
Hospital Medicine Progress Note, Adult, Complex               Author: Remedios Timmy Date & Time created: 3/29/2017  2:10 PM     Interval History:  60 y/o female with hx/of HTN, DM II, CKD III, proteinuria  Admitted with worsening renal function, significant nephrotic range proteinuria  Doing better  Improved SOB, edema  No N/V  Plan for renal biopsy tomorrow    Review of Systems:  Review of Systems   Constitutional: Positive for malaise/fatigue. Negative for fever and chills.   HENT: Negative for congestion, nosebleeds and sore throat.    Eyes: Negative.    Respiratory: Negative for cough, hemoptysis and wheezing.    Cardiovascular: Positive for orthopnea and leg swelling. Negative for chest pain and palpitations.   Gastrointestinal: Negative for heartburn, nausea, vomiting, abdominal pain and diarrhea.   Genitourinary: Negative for dysuria, urgency, frequency, hematuria and flank pain.   Musculoskeletal: Negative for myalgias, back pain, joint pain and neck pain.   Skin: Negative for itching and rash.   Neurological: Negative for dizziness, sensory change, focal weakness and headaches.       Physical Exam:  Physical Exam   Constitutional: She is oriented to person, place, and time. She appears well-developed and well-nourished. No distress.   HENT:   Head: Normocephalic and atraumatic.   Mouth/Throat: Oropharynx is clear and moist.   Eyes: Conjunctivae and EOM are normal. Pupils are equal, round, and reactive to light.   Neck: Normal range of motion. Neck supple.   Cardiovascular: Normal rate and regular rhythm.  Exam reveals no gallop and no friction rub.    Pulmonary/Chest: Effort normal and breath sounds normal. No respiratory distress. She has no wheezes. She has no rales.   Abdominal: Soft. Bowel sounds are normal. She exhibits no distension and no mass. There is no tenderness.   Musculoskeletal: She exhibits edema.   Neurological: She is alert and oriented to person, place, and time. No cranial nerve deficit.  Coordination normal.   Skin: Skin is warm. No rash noted. No erythema.   Nursing note and vitals reviewed.      Labs:        Invalid input(s): [id number]  Recent Labs      17   TROPONINI  <0.01   BNPBTYPENAT  108*     Recent Labs      17   0402   SODIUM  133*  134*  135   POTASSIUM  5.0  4.9  4.3   CHLORIDE  107  104  106   CO2  19*  25  23   BUN  32*  31*  31*   CREATININE  2.78*  3.05*  3.55*   PHOSPHORUS   --   4.2  5.4*   CALCIUM  8.6  8.6  8.4*     Recent Labs      17   0402   ALTSGPT  15   --    --    ASTSGOT  16   --    --    ALKPHOSPHAT  125*   --    --    TBILIRUBIN  0.3   --    --    GLUCOSE  246*  221*  94     Recent Labs      17   0402   RBC  4.15*  3.85*   --    HEMOGLOBIN  11.8*  10.8*   --    HEMATOCRIT  35.6*  33.4*   --    PLATELETCT  282  265   --    PROTHROMBTM   --    --   13.5   APTT   --    --   55.3*   INR   --    --   1.00     Recent Labs      17   WBC  9.6  8.2   NEUTSPOLYS  65.00  66.70   LYMPHOCYTES  22.70  20.80*   MONOCYTES  8.10  8.40   EOSINOPHILS  3.00  3.20   BASOPHILS  0.60  0.50   ASTSGOT  16   --    ALTSGPT  15   --    ALKPHOSPHAT  125*   --    TBILIRUBIN  0.3   --            Hemodynamics:  Temp (24hrs), Av.2 °C (98.9 °F), Min:36.7 °C (98.1 °F), Max:37.7 °C (99.8 °F)  Temperature:  (medical)  Pulse  Av.2  Min: 65  Max: 81   Blood Pressure:  (medical)     Respiratory:    Respiration: 16, Pulse Oximetry:  (medical)        RUL Breath Sounds: Clear, RML Breath Sounds: Diminished, RLL Breath Sounds: Diminished, KHRIS Breath Sounds: Clear, LLL Breath Sounds: Diminished  Fluids:  No intake or output data in the 24 hours ending 17 1410  Weight: 81.2 kg (179 lb 0.2 oz)  GI/Nutrition:  Orders Placed This Encounter   Procedures   • DIET ORDER     Standing Status: Standing      Number of Occurrences: 1      Standing  Expiration Date:      Order Specific Question:  Diet:     Answer:  Diabetic [3]     Order Specific Question:  Calorie modifications:     Answer:  1800 kcals [4]     Medical Decision Making, by Problem:  Active Hospital Problems    Diagnosis   • Anasarca associated with disorder of kidney [N04.9]       Labs reviewed and Medications reviewed                  Assessment and plan    1.DOTTIE/CKD III-IV -worsening creat level    With nephrotic range proteinuria (DMN vs GN)  2.HTN: BP well controlled  3.Electrolytes: well controlled.  4.Anemia: Hb to montior  5.Volume ; edema better       Recs: low Na diet             Avoid nephrotoxic agents             F/u serology results             Diagnostic kidney biopsy -tomorrow

## 2017-03-30 ENCOUNTER — APPOINTMENT (OUTPATIENT)
Dept: RADIOLOGY | Facility: MEDICAL CENTER | Age: 62
DRG: 699 | End: 2017-03-30
Attending: INTERNAL MEDICINE
Payer: MEDICAID

## 2017-03-30 LAB
ANION GAP SERPL CALC-SCNC: 9 MMOL/L (ref 0–11.9)
BUN SERPL-MCNC: 33 MG/DL (ref 8–22)
CALCIUM SERPL-MCNC: 8.2 MG/DL (ref 8.5–10.5)
CHLORIDE SERPL-SCNC: 107 MMOL/L (ref 96–112)
CO2 SERPL-SCNC: 23 MMOL/L (ref 20–33)
CREAT SERPL-MCNC: 3.58 MG/DL (ref 0.5–1.4)
GFR SERPL CREATININE-BSD FRML MDRD: 13 ML/MIN/1.73 M 2
GLUCOSE BLD-MCNC: 110 MG/DL (ref 65–99)
GLUCOSE BLD-MCNC: 168 MG/DL (ref 65–99)
GLUCOSE BLD-MCNC: 195 MG/DL (ref 65–99)
GLUCOSE BLD-MCNC: 58 MG/DL (ref 65–99)
GLUCOSE BLD-MCNC: 59 MG/DL (ref 65–99)
GLUCOSE BLD-MCNC: 62 MG/DL (ref 65–99)
GLUCOSE BLD-MCNC: 65 MG/DL (ref 65–99)
GLUCOSE BLD-MCNC: 78 MG/DL (ref 65–99)
GLUCOSE BLD-MCNC: 92 MG/DL (ref 65–99)
GLUCOSE SERPL-MCNC: 59 MG/DL (ref 65–99)
POTASSIUM SERPL-SCNC: 4.2 MMOL/L (ref 3.6–5.5)
SODIUM SERPL-SCNC: 139 MMOL/L (ref 135–145)

## 2017-03-30 PROCEDURE — 82962 GLUCOSE BLOOD TEST: CPT | Mod: 91

## 2017-03-30 PROCEDURE — A9270 NON-COVERED ITEM OR SERVICE: HCPCS | Performed by: HOSPITALIST

## 2017-03-30 PROCEDURE — 0TB13ZX EXCISION OF LEFT KIDNEY, PERCUTANEOUS APPROACH, DIAGNOSTIC: ICD-10-PCS | Performed by: RADIOLOGY

## 2017-03-30 PROCEDURE — 50200 RENAL BIOPSY PERQ: CPT

## 2017-03-30 PROCEDURE — 80048 BASIC METABOLIC PNL TOTAL CA: CPT

## 2017-03-30 PROCEDURE — 770006 HCHG ROOM/CARE - MED/SURG/GYN SEMI*

## 2017-03-30 PROCEDURE — 88300 SURGICAL PATH GROSS: CPT

## 2017-03-30 PROCEDURE — 700101 HCHG RX REV CODE 250: Performed by: HOSPITALIST

## 2017-03-30 PROCEDURE — 700102 HCHG RX REV CODE 250 W/ 637 OVERRIDE(OP): Performed by: HOSPITALIST

## 2017-03-30 PROCEDURE — 36415 COLL VENOUS BLD VENIPUNCTURE: CPT

## 2017-03-30 PROCEDURE — 99232 SBSQ HOSP IP/OBS MODERATE 35: CPT | Performed by: HOSPITALIST

## 2017-03-30 PROCEDURE — 94760 N-INVAS EAR/PLS OXIMETRY 1: CPT

## 2017-03-30 PROCEDURE — 700111 HCHG RX REV CODE 636 W/ 250 OVERRIDE (IP): Performed by: HOSPITALIST

## 2017-03-30 PROCEDURE — 700111 HCHG RX REV CODE 636 W/ 250 OVERRIDE (IP)

## 2017-03-30 RX ORDER — MIDAZOLAM HYDROCHLORIDE 1 MG/ML
INJECTION INTRAMUSCULAR; INTRAVENOUS
Status: COMPLETED
Start: 2017-03-30 | End: 2017-03-30

## 2017-03-30 RX ORDER — DEXTROSE MONOHYDRATE 25 G/50ML
25 INJECTION, SOLUTION INTRAVENOUS ONCE
Status: COMPLETED | OUTPATIENT
Start: 2017-03-30 | End: 2017-03-30

## 2017-03-30 RX ORDER — NALOXONE HYDROCHLORIDE 0.4 MG/ML
INJECTION, SOLUTION INTRAMUSCULAR; INTRAVENOUS; SUBCUTANEOUS
Status: COMPLETED
Start: 2017-03-30 | End: 2017-03-30

## 2017-03-30 RX ORDER — MIDAZOLAM HYDROCHLORIDE 1 MG/ML
.5-2 INJECTION INTRAMUSCULAR; INTRAVENOUS PRN
Status: ACTIVE | OUTPATIENT
Start: 2017-03-30 | End: 2017-03-30

## 2017-03-30 RX ORDER — SODIUM CHLORIDE 9 MG/ML
500 INJECTION, SOLUTION INTRAVENOUS PRN
Status: DISCONTINUED | OUTPATIENT
Start: 2017-03-30 | End: 2017-03-31

## 2017-03-30 RX ORDER — DEXTROSE, SODIUM CHLORIDE, SODIUM LACTATE, POTASSIUM CHLORIDE, AND CALCIUM CHLORIDE 5; .6; .31; .03; .02 G/100ML; G/100ML; G/100ML; G/100ML; G/100ML
INJECTION, SOLUTION INTRAVENOUS CONTINUOUS
Status: DISCONTINUED | OUTPATIENT
Start: 2017-03-30 | End: 2017-03-31

## 2017-03-30 RX ORDER — ONDANSETRON 2 MG/ML
4 INJECTION INTRAMUSCULAR; INTRAVENOUS PRN
Status: ACTIVE | OUTPATIENT
Start: 2017-03-30 | End: 2017-03-30

## 2017-03-30 RX ADMIN — MIDAZOLAM HYDROCHLORIDE 2 MG: 1 INJECTION INTRAMUSCULAR; INTRAVENOUS at 10:43

## 2017-03-30 RX ADMIN — OXYCODONE HYDROCHLORIDE 5 MG: 5 TABLET ORAL at 15:00

## 2017-03-30 RX ADMIN — FENTANYL CITRATE 50 MCG: 50 INJECTION, SOLUTION INTRAMUSCULAR; INTRAVENOUS at 10:42

## 2017-03-30 RX ADMIN — INSULIN LISPRO 2 UNITS: 100 INJECTION, SOLUTION INTRAVENOUS; SUBCUTANEOUS at 21:31

## 2017-03-30 RX ADMIN — POLYETHYLENE GLYCOL 3350 1 PACKET: 17 POWDER, FOR SOLUTION ORAL at 21:13

## 2017-03-30 RX ADMIN — LACTOBACILLUS ACIDOPHILUS / LACTOBACILLUS BULGARICUS 1 PACKET: 100 MILLION CFU STRENGTH GRANULES at 17:46

## 2017-03-30 RX ADMIN — OMEPRAZOLE 40 MG: 20 CAPSULE, DELAYED RELEASE ORAL at 08:51

## 2017-03-30 RX ADMIN — HEPARIN SODIUM 5000 UNITS: 5000 INJECTION, SOLUTION INTRAVENOUS; SUBCUTANEOUS at 21:13

## 2017-03-30 RX ADMIN — DEXTROSE MONOHYDRATE 25 ML: 500 INJECTION PARENTERAL at 03:35

## 2017-03-30 RX ADMIN — SODIUM CHLORIDE, SODIUM LACTATE, POTASSIUM CHLORIDE, CALCIUM CHLORIDE AND DEXTROSE MONOHYDRATE: 5; 600; 310; 30; 20 INJECTION, SOLUTION INTRAVENOUS at 08:58

## 2017-03-30 RX ADMIN — MIDAZOLAM 2 MG: 1 INJECTION INTRAMUSCULAR; INTRAVENOUS at 10:43

## 2017-03-30 RX ADMIN — CARVEDILOL 6.25 MG: 6.25 TABLET, FILM COATED ORAL at 17:46

## 2017-03-30 RX ADMIN — DEXTROSE MONOHYDRATE 25 ML: 25 INJECTION, SOLUTION INTRAVENOUS at 09:04

## 2017-03-30 RX ADMIN — ATORVASTATIN CALCIUM 40 MG: 40 TABLET, FILM COATED ORAL at 21:13

## 2017-03-30 RX ADMIN — HEPARIN SODIUM 5000 UNITS: 5000 INJECTION, SOLUTION INTRAVENOUS; SUBCUTANEOUS at 15:00

## 2017-03-30 RX ADMIN — STANDARDIZED SENNA CONCENTRATE AND DOCUSATE SODIUM 2 TABLET: 8.6; 5 TABLET, FILM COATED ORAL at 21:13

## 2017-03-30 ASSESSMENT — ENCOUNTER SYMPTOMS
HEMOPTYSIS: 0
INSOMNIA: 1
SHORTNESS OF BREATH: 0
WEIGHT LOSS: 0
SORE THROAT: 0
VOMITING: 0
WHEEZING: 0
NAUSEA: 0
MYALGIAS: 0
ABDOMINAL PAIN: 0
HEADACHES: 0
NECK PAIN: 0
HEARTBURN: 0
FLANK PAIN: 0
FOCAL WEAKNESS: 0
DIARRHEA: 0
DIZZINESS: 0
TREMORS: 0
SENSORY CHANGE: 0
BACK PAIN: 0
EYES NEGATIVE: 1
CHILLS: 0
FEVER: 0
COUGH: 0
SPEECH CHANGE: 0
PALPITATIONS: 0
TINGLING: 0
ORTHOPNEA: 0

## 2017-03-30 ASSESSMENT — PAIN SCALES - GENERAL
PAINLEVEL_OUTOF10: 4
PAINLEVEL_OUTOF10: 0
PAINLEVEL_OUTOF10: 0

## 2017-03-30 NOTE — PROGRESS NOTES
Pt underwent an US guided non-target renal biopsy performed by Dr Warren. Pt remained hemodynamically stable throughout the procedure. No adverse reaction noted. Band-Aid to procedure site cdi. Cores x 2 verified and taken to  taken to lab by pathologist. Report to ANGEL LUIS Castillo. Pt taken back to room in stable condition.

## 2017-03-30 NOTE — PROGRESS NOTES
Assumed care of pt at shift change, report received from day shift RN. Pt A&Ox4, resting in bed. Denies pain, SOB, nausea or dizziness at this time. Pt to the bathroom, no assistance required, showered. All needs addressed at this time. Bed in low position, call light within reach, hourly rounding in place.

## 2017-03-30 NOTE — OR SURGEON
Immediate Post- Operative Note    PostOp Diagnosis: RENAL INSUFFICIENCY      Procedure(s): US GUIDED LEFT RENAL BIOPSY    14G CORES X 2      Estimated Blood Loss: <5CC        Complications: NONE            3/30/2017     10:53 AM     Doc Warren

## 2017-03-30 NOTE — CARE PLAN
Problem: Pain Management  Goal: Pain level will decrease to patient’s comfort goal  Outcome: PROGRESSING AS EXPECTED  Medicated for pain per MAR.     Problem: Mobility  Goal: Risk for activity intolerance will decrease  Outcome: PROGRESSING AS EXPECTED  Up self, steady gait, no assistance required.

## 2017-03-30 NOTE — PROGRESS NOTES
RECEIVED PT FROM TELE VIA WC. VSS. MEDICATIONS GIVEN. PT COMPLAINING OF PAIN PRN MEDICATIONS GIVEN. PT ALERT AND ORIENTED TIMES 4. BED IN LOCK POSITION. NO CONCERNS AT THIS TIME

## 2017-03-30 NOTE — PROGRESS NOTES
Hospital Medicine Progress Note, Adult, Complex               Author: Jason Adan MD Date & Time created: 3/30/2017  12:33 PM     Interval History:  Admitted for ARF, edema, uncontrolled DM.  .     Now on medical floor    Afebrile    Denies pain    Kidney biopsy today    Dealing with hypoglycemia- she is symptomatic    Review of Systems:  Review of Systems   Constitutional: Negative for fever, chills and weight loss.   HENT: Negative for ear pain and hearing loss.    Respiratory: Negative for cough and shortness of breath.    Cardiovascular: Positive for leg swelling. Negative for chest pain and palpitations.   Gastrointestinal: Negative for nausea, vomiting and abdominal pain.   Genitourinary: Negative for dysuria, urgency and frequency.   Neurological: Negative for dizziness, tingling, tremors, sensory change and speech change.   Psychiatric/Behavioral: The patient has insomnia.    All other systems reviewed and are negative.      Physical Exam:  Physical Exam   Constitutional: She is oriented to person, place, and time. No distress.   Eyes: Left eye exhibits no discharge.   Neck: No tracheal deviation present. No thyromegaly present.   Cardiovascular: Normal rate.  Exam reveals no gallop and no friction rub.    No murmur heard.  Pulmonary/Chest: No respiratory distress. She has no wheezes. She has no rales.   Abdominal: She exhibits no distension. There is no tenderness.   Musculoskeletal: She exhibits edema. She exhibits no tenderness.   Neurological: She is alert and oriented to person, place, and time. No cranial nerve deficit. Coordination normal.         .   Labs:        Invalid input(s): [id number]  Recent Labs      03/27/17   1345   TROPONINI  <0.01   BNPBTYPENAT  108*     Recent Labs      03/27/17   2315  03/29/17   0402  03/30/17   0316   SODIUM  134*  135  139   POTASSIUM  4.9  4.3  4.2   CHLORIDE  104  106  107   CO2  25  23  23   BUN  31*  31*  33*   CREATININE  3.05*  3.55*  3.58*   PHOSPHORUS   4.2  5.4*   --    CALCIUM  8.6  8.4*  8.2*     Recent Labs      17   1345  17   2315  17   0402  17   0316   ALTSGPT  15   --    --    --    ASTSGOT  16   --    --    --    ALKPHOSPHAT  125*   --    --    --    TBILIRUBIN  0.3   --    --    --    GLUCOSE  246*  221*  94  59*     Recent Labs      17   1345  175  17   0402   RBC  4.15*  3.85*   --    HEMOGLOBIN  11.8*  10.8*   --    HEMATOCRIT  35.6*  33.4*   --    PLATELETCT  282  265   --    PROTHROMBTM   --    --   13.5   APTT   --    --   55.3*   INR   --    --   1.00     Recent Labs      175  17   2315   WBC  9.6  8.2   NEUTSPOLYS  65.00  66.70   LYMPHOCYTES  22.70  20.80*   MONOCYTES  8.10  8.40   EOSINOPHILS  3.00  3.20   BASOPHILS  0.60  0.50   ASTSGOT  16   --    ALTSGPT  15   --    ALKPHOSPHAT  125*   --    TBILIRUBIN  0.3   --            Hemodynamics:  Temp (24hrs), Av °C (98.6 °F), Min:36.6 °C (97.8 °F), Max:37.2 °C (99 °F)  Temperature: 37.2 °C (99 °F)  Pulse  Av.3  Min: 61  Max: 90   Blood Pressure: 130/73 mmHg     Respiratory:    Respiration: 14, Pulse Oximetry: 97 %        RUL Breath Sounds: Clear, RML Breath Sounds: Diminished, RLL Breath Sounds: Diminished, KHRIS Breath Sounds: Clear, LLL Breath Sounds: Diminished  Fluids:    Intake/Output Summary (Last 24 hours) at 17 1233  Last data filed at 17 0600   Gross per 24 hour   Intake    240 ml   Output      0 ml   Net    240 ml        GI/Nutrition:  Orders Placed This Encounter   Procedures   • DIET ORDER     Standing Status: Standing      Number of Occurrences: 1      Standing Expiration Date:      Order Specific Question:  Diet:     Answer:  Diabetic [3]     Order Specific Question:  Calorie modifications:     Answer:  1800 kcals [4]     Medical Decision Making, by Problem:  Active Hospital Problems    Diagnosis   • Anasarca associated with disorder of kidney [N04.9] - creatinine increasing.  Follow c IVF and med  changes.  Echo is WNL.        Lower extremity edema- DVT eval neg.  MARIE/SCD's.      Obesity - encourage decreased Kcal diet.      DM 2 - uncontrolled.  Home regimen and cover c ISS.    Hypoglycemia- 1/2 amp of d50  Started on d5 IVF until npo status is over and patient is able to eat      CKD 3/ARF - worsening.  Renal biopsy pending.  Neph on.      FRANK - encourage CPAP compliance.    Hep C - outpatient follow up.    Insomnia - outpatient follow up.    Stable issues - med hx (HTN, HLD, GERD, hypothyroid), preventives, hyponatremia.  Dispo -home when stable    Check am cbc, bmp      Labs reviewed and Medications reviewed  Jhaveri catheter: No Jhaveri      DVT Prophylaxis: Heparin        Assessed for rehab: Patient unable to tolerate rehabilitation therapeutic regimen

## 2017-03-30 NOTE — PROGRESS NOTES
Hospital Medicine Progress Note, Adult, Complex               Author: Remedios Timmy Date & Time created: 3/30/2017  3:47 PM     Interval History:  62 y/o female with hx/of HTN, DM II, CKD III, proteinuria  Admitted with worsening renal function, significant nephrotic range proteinuria  Doing better  Improved SOB, edema  No N/V  Completed renal biopsy this morning -tolerated well  Review of Systems:  Review of Systems   Constitutional: Positive for malaise/fatigue. Negative for fever and chills.   HENT: Negative for congestion, nosebleeds and sore throat.    Eyes: Negative.    Respiratory: Negative for cough, hemoptysis and wheezing.    Cardiovascular: Negative for chest pain, palpitations, orthopnea and leg swelling.   Gastrointestinal: Negative for heartburn, nausea, vomiting, abdominal pain and diarrhea.   Genitourinary: Negative for dysuria, urgency, frequency, hematuria and flank pain.   Musculoskeletal: Negative for myalgias, back pain, joint pain and neck pain.   Skin: Negative for itching and rash.   Neurological: Negative for dizziness, sensory change, focal weakness and headaches.       Physical Exam:  Physical Exam   Constitutional: She is oriented to person, place, and time. She appears well-developed and well-nourished. No distress.   HENT:   Head: Normocephalic and atraumatic.   Mouth/Throat: Oropharynx is clear and moist.   Eyes: Conjunctivae and EOM are normal. Pupils are equal, round, and reactive to light.   Neck: Normal range of motion. Neck supple.   Cardiovascular: Normal rate and regular rhythm.  Exam reveals no gallop and no friction rub.    Pulmonary/Chest: Effort normal and breath sounds normal. No respiratory distress. She has no wheezes. She has no rales.   Abdominal: Soft. Bowel sounds are normal. She exhibits no distension and no mass. There is no tenderness.   Musculoskeletal: She exhibits no edema.   Neurological: She is alert and oriented to person, place, and time. No cranial nerve  deficit. Coordination normal.   Skin: Skin is warm. No rash noted. No erythema.   Nursing note and vitals reviewed.      Labs:        Invalid input(s): [id number]      Recent Labs      176   SODIUM  134*  135  139   POTASSIUM  4.9  4.3  4.2   CHLORIDE  104  106  107   CO2  25  23  23   BUN  31*  31*  33*   CREATININE  3.05*  3.55*  3.58*   PHOSPHORUS  4.2  5.4*   --    CALCIUM  8.6  8.4*  8.2*     Recent Labs      172  17   GLUCOSE  221*  94  59*     Recent Labs      17   RBC  3.85*   --    HEMOGLOBIN  10.8*   --    HEMATOCRIT  33.4*   --    PLATELETCT  265   --    PROTHROMBTM   --   13.5   APTT   --   55.3*   INR   --   1.00     Recent Labs      17   WBC  8.2   NEUTSPOLYS  66.70   LYMPHOCYTES  20.80*   MONOCYTES  8.40   EOSINOPHILS  3.20   BASOPHILS  0.50           Hemodynamics:  Temp (24hrs), Av °C (98.6 °F), Min:36.6 °C (97.8 °F), Max:37.2 °C (99 °F)  Temperature: 37.2 °C (99 °F)  Pulse  Av.6  Min: 61  Max: 90   Blood Pressure: 130/73 mmHg     Respiratory:    Respiration: 16, Pulse Oximetry: 94 %, O2 Daily Delivery Respiratory : Room Air with O2 Available        RUL Breath Sounds: Clear, RML Breath Sounds: Diminished, RLL Breath Sounds: Diminished, KHRIS Breath Sounds: Clear, LLL Breath Sounds: Diminished  Fluids:    Intake/Output Summary (Last 24 hours) at 17 1547  Last data filed at 17 0600   Gross per 24 hour   Intake    240 ml   Output      0 ml   Net    240 ml        GI/Nutrition:  Orders Placed This Encounter   Procedures   • DIET ORDER     Standing Status: Standing      Number of Occurrences: 1      Standing Expiration Date:      Order Specific Question:  Diet:     Answer:  Diabetic [3]     Order Specific Question:  Calorie modifications:     Answer:  1800 kcals [4]     Medical Decision Making, by Problem:  Active Hospital Problems    Diagnosis   •  Anasarca associated with disorder of kidney [N04.9]       Labs reviewed and Medications reviewed                  Assessment and plan    1.DOTTIE/CKD III-IV -worsening creat level -to monitor    With nephrotic range proteinuria (DMN vs GN) -serology negative, biopsy results pending  2.HTN: BP well controlled  3.Electrolytes: well controlled.  4.Anemia: Hb to montior  5.Volume ; edema better       Recs: low Na diet             No need for emergent dialysis             Avoid nephrotoxic agents                       S/p Diagnostic kidney biopsy  -results to follow

## 2017-03-30 NOTE — PROGRESS NOTES
RECEIVED PT FROM NIGHT RN. ASSESSMENT COMPLETED. SOME MEDICATIONS GIVEN. PT COMPLAINED OF FEELING SHAKY AND CLAMMY. BS CHECKED AT 58. MD AWARE AND DEXTROSE AND LRD5 ORDERED AT 83ML/HR. PT LEFT FOR PROCEDURE PRIOR TO RECHECKING BS. VSS. COREG HELD FOR LOWER BP THIS MORNING. PT DIDN'T WANT WITH PRESSURES LOW TO HER. NO OTHER CONCERNS    1030-PT OFF UNIT TO IR FOR BIOPSY

## 2017-03-31 ENCOUNTER — APPOINTMENT (OUTPATIENT)
Dept: RADIOLOGY | Facility: MEDICAL CENTER | Age: 62
DRG: 699 | End: 2017-03-31
Attending: NURSE PRACTITIONER
Payer: MEDICAID

## 2017-03-31 LAB
ALBUMIN SERPL BCP-MCNC: 2.8 G/DL (ref 3.2–4.9)
ALBUMIN/GLOB SERPL: 0.9 G/DL
ALP SERPL-CCNC: 109 U/L (ref 30–99)
ALT SERPL-CCNC: 21 U/L (ref 2–50)
ANION GAP SERPL CALC-SCNC: 7 MMOL/L (ref 0–11.9)
AST SERPL-CCNC: 23 U/L (ref 12–45)
BASOPHILS # BLD AUTO: 0.5 % (ref 0–1.8)
BASOPHILS # BLD: 0.04 K/UL (ref 0–0.12)
BILIRUB SERPL-MCNC: 0.2 MG/DL (ref 0.1–1.5)
BUN SERPL-MCNC: 31 MG/DL (ref 8–22)
CALCIUM SERPL-MCNC: 8.2 MG/DL (ref 8.5–10.5)
CHLORIDE SERPL-SCNC: 107 MMOL/L (ref 96–112)
CO2 SERPL-SCNC: 21 MMOL/L (ref 20–33)
CREAT SERPL-MCNC: 3.17 MG/DL (ref 0.5–1.4)
EKG IMPRESSION: NORMAL
EOSINOPHIL # BLD AUTO: 0.31 K/UL (ref 0–0.51)
EOSINOPHIL NFR BLD: 3.5 % (ref 0–6.9)
ERYTHROCYTE [DISTWIDTH] IN BLOOD BY AUTOMATED COUNT: 37.6 FL (ref 35.9–50)
GFR SERPL CREATININE-BSD FRML MDRD: 15 ML/MIN/1.73 M 2
GLOBULIN SER CALC-MCNC: 3.1 G/DL (ref 1.9–3.5)
GLUCOSE BLD-MCNC: 102 MG/DL (ref 65–99)
GLUCOSE BLD-MCNC: 115 MG/DL (ref 65–99)
GLUCOSE BLD-MCNC: 116 MG/DL (ref 65–99)
GLUCOSE BLD-MCNC: 190 MG/DL (ref 65–99)
GLUCOSE BLD-MCNC: 196 MG/DL (ref 65–99)
GLUCOSE SERPL-MCNC: 112 MG/DL (ref 65–99)
HCT VFR BLD AUTO: 31.2 % (ref 37–47)
HGB BLD-MCNC: 10.3 G/DL (ref 12–16)
IMM GRANULOCYTES # BLD AUTO: 0.02 K/UL (ref 0–0.11)
IMM GRANULOCYTES NFR BLD AUTO: 0.2 % (ref 0–0.9)
LYMPHOCYTES # BLD AUTO: 2.28 K/UL (ref 1–4.8)
LYMPHOCYTES NFR BLD: 25.9 % (ref 22–41)
MCH RBC QN AUTO: 28.1 PG (ref 27–33)
MCHC RBC AUTO-ENTMCNC: 33 G/DL (ref 33.6–35)
MCV RBC AUTO: 85.2 FL (ref 81.4–97.8)
MONOCYTES # BLD AUTO: 0.91 K/UL (ref 0–0.85)
MONOCYTES NFR BLD AUTO: 10.3 % (ref 0–13.4)
NEUTROPHILS # BLD AUTO: 5.25 K/UL (ref 2–7.15)
NEUTROPHILS NFR BLD: 59.6 % (ref 44–72)
NRBC # BLD AUTO: 0 K/UL
NRBC BLD AUTO-RTO: 0 /100 WBC
PLATELET # BLD AUTO: 239 K/UL (ref 164–446)
PMV BLD AUTO: 9.9 FL (ref 9–12.9)
POTASSIUM SERPL-SCNC: 4.1 MMOL/L (ref 3.6–5.5)
PROT SERPL-MCNC: 5.9 G/DL (ref 6–8.2)
RBC # BLD AUTO: 3.66 M/UL (ref 4.2–5.4)
SODIUM SERPL-SCNC: 135 MMOL/L (ref 135–145)
TROPONIN I SERPL-MCNC: <0.01 NG/ML (ref 0–0.04)
WBC # BLD AUTO: 8.8 K/UL (ref 4.8–10.8)

## 2017-03-31 PROCEDURE — 85025 COMPLETE CBC W/AUTO DIFF WBC: CPT

## 2017-03-31 PROCEDURE — 700111 HCHG RX REV CODE 636 W/ 250 OVERRIDE (IP): Performed by: NURSE PRACTITIONER

## 2017-03-31 PROCEDURE — 93010 ELECTROCARDIOGRAM REPORT: CPT | Performed by: INTERNAL MEDICINE

## 2017-03-31 PROCEDURE — 700111 HCHG RX REV CODE 636 W/ 250 OVERRIDE (IP): Performed by: HOSPITALIST

## 2017-03-31 PROCEDURE — 700102 HCHG RX REV CODE 250 W/ 637 OVERRIDE(OP): Performed by: HOSPITALIST

## 2017-03-31 PROCEDURE — 80053 COMPREHEN METABOLIC PANEL: CPT

## 2017-03-31 PROCEDURE — 99232 SBSQ HOSP IP/OBS MODERATE 35: CPT | Performed by: HOSPITALIST

## 2017-03-31 PROCEDURE — 84484 ASSAY OF TROPONIN QUANT: CPT

## 2017-03-31 PROCEDURE — A9270 NON-COVERED ITEM OR SERVICE: HCPCS | Performed by: HOSPITALIST

## 2017-03-31 PROCEDURE — 36415 COLL VENOUS BLD VENIPUNCTURE: CPT

## 2017-03-31 PROCEDURE — 71010 DX-CHEST-PORTABLE (1 VIEW): CPT

## 2017-03-31 PROCEDURE — 770006 HCHG ROOM/CARE - MED/SURG/GYN SEMI*

## 2017-03-31 PROCEDURE — 93005 ELECTROCARDIOGRAM TRACING: CPT | Performed by: NURSE PRACTITIONER

## 2017-03-31 PROCEDURE — 82962 GLUCOSE BLOOD TEST: CPT | Mod: 91

## 2017-03-31 RX ORDER — HYDRALAZINE HYDROCHLORIDE 20 MG/ML
20 INJECTION INTRAMUSCULAR; INTRAVENOUS EVERY 6 HOURS PRN
Status: DISCONTINUED | OUTPATIENT
Start: 2017-03-31 | End: 2017-04-01 | Stop reason: HOSPADM

## 2017-03-31 RX ADMIN — HEPARIN SODIUM 5000 UNITS: 5000 INJECTION, SOLUTION INTRAVENOUS; SUBCUTANEOUS at 21:23

## 2017-03-31 RX ADMIN — STANDARDIZED SENNA CONCENTRATE AND DOCUSATE SODIUM 2 TABLET: 8.6; 5 TABLET, FILM COATED ORAL at 08:20

## 2017-03-31 RX ADMIN — LACTOBACILLUS ACIDOPHILUS / LACTOBACILLUS BULGARICUS 1 PACKET: 100 MILLION CFU STRENGTH GRANULES at 08:19

## 2017-03-31 RX ADMIN — LACTOBACILLUS ACIDOPHILUS / LACTOBACILLUS BULGARICUS 1 PACKET: 100 MILLION CFU STRENGTH GRANULES at 12:03

## 2017-03-31 RX ADMIN — SODIUM CHLORIDE, SODIUM LACTATE, POTASSIUM CHLORIDE, CALCIUM CHLORIDE AND DEXTROSE MONOHYDRATE: 5; 600; 310; 30; 20 INJECTION, SOLUTION INTRAVENOUS at 05:19

## 2017-03-31 RX ADMIN — HEPARIN SODIUM 5000 UNITS: 5000 INJECTION, SOLUTION INTRAVENOUS; SUBCUTANEOUS at 05:35

## 2017-03-31 RX ADMIN — STANDARDIZED SENNA CONCENTRATE AND DOCUSATE SODIUM 2 TABLET: 8.6; 5 TABLET, FILM COATED ORAL at 21:23

## 2017-03-31 RX ADMIN — LACTOBACILLUS ACIDOPHILUS / LACTOBACILLUS BULGARICUS 1 PACKET: 100 MILLION CFU STRENGTH GRANULES at 17:04

## 2017-03-31 RX ADMIN — OXYCODONE HYDROCHLORIDE 5 MG: 5 TABLET ORAL at 01:13

## 2017-03-31 RX ADMIN — CARVEDILOL 6.25 MG: 6.25 TABLET, FILM COATED ORAL at 08:20

## 2017-03-31 RX ADMIN — OMEPRAZOLE 40 MG: 20 CAPSULE, DELAYED RELEASE ORAL at 09:04

## 2017-03-31 RX ADMIN — INSULIN LISPRO 2 UNITS: 100 INJECTION, SOLUTION INTRAVENOUS; SUBCUTANEOUS at 21:21

## 2017-03-31 RX ADMIN — ATORVASTATIN CALCIUM 40 MG: 40 TABLET, FILM COATED ORAL at 21:23

## 2017-03-31 RX ADMIN — INSULIN LISPRO 2 UNITS: 100 INJECTION, SOLUTION INTRAVENOUS; SUBCUTANEOUS at 12:01

## 2017-03-31 RX ADMIN — LORATADINE 5 MG: 10 TABLET ORAL at 08:20

## 2017-03-31 RX ADMIN — CARVEDILOL 6.25 MG: 6.25 TABLET, FILM COATED ORAL at 17:04

## 2017-03-31 RX ADMIN — HYDRALAZINE HYDROCHLORIDE 20 MG: 20 INJECTION INTRAMUSCULAR; INTRAVENOUS at 21:23

## 2017-03-31 ASSESSMENT — ENCOUNTER SYMPTOMS
VOMITING: 0
SHORTNESS OF BREATH: 0
SPEECH CHANGE: 0
ABDOMINAL PAIN: 0
INSOMNIA: 0
HEADACHES: 0
WHEEZING: 0
FOCAL WEAKNESS: 0
PALPITATIONS: 0
FLANK PAIN: 0
COUGH: 0
TINGLING: 0
CHILLS: 0
EYES NEGATIVE: 1
NAUSEA: 0
SORE THROAT: 0
DIARRHEA: 0
ORTHOPNEA: 0
DIZZINESS: 0
WEIGHT LOSS: 0
NECK PAIN: 0
TREMORS: 0
SENSORY CHANGE: 0
MYALGIAS: 0
HEMOPTYSIS: 0
HEARTBURN: 0
BACK PAIN: 0
FEVER: 0

## 2017-03-31 ASSESSMENT — PAIN SCALES - GENERAL
PAINLEVEL_OUTOF10: 6
PAINLEVEL_OUTOF10: 0
PAINLEVEL_OUTOF10: 0

## 2017-03-31 NOTE — CARE PLAN
Problem: Bowel/Gastric:  Goal: Normal bowel function is maintained or improved  Outcome: PROGRESSING AS EXPECTED  Patient having regular BMs, accepting bowel medications. No other issues.     Problem: Discharge Barriers/Planning  Goal: Patient’s continuum of care needs will be met  Outcome: PROGRESSING AS EXPECTED  Per nephrology, patient to stay another inpatient night for monitoring, then home possible tomorrow.

## 2017-03-31 NOTE — PROGRESS NOTES
RECEIVED PT FROM NIGHT RN. PT SLEEPING DURING REPORT. PT ALERT AND ORIENTED TIMES 4. ASSESSMENT COMPLETED. MEDICATIONS GIVEN. PT COMPLAINING OF PAIN PRN GIVEN. PT WITH LOW BS DURING SHIFT. MD AWARE AND ORDERS GIVEN. PT LAST  BEFORE NIGHT SHIFT.  PT  EDUCATED ON THE NEED TO HAVE IV FLUIDS WITH LRD5W DUE TO LOW BS ALL SHIFT. PT AND FAMILY UNDERSTAND AND AGREED. VSS. NO OTHER CONCERNS

## 2017-03-31 NOTE — PROGRESS NOTES
Nephrology Progress Note, Adult, Complex               Author: Remedios Timmy Date & Time created: 3/31/2017  4:51 PM     Interval History:  62 y/o female with hx/of HTN, DM II, CKD III, proteinuria  Admitted with worsening renal function, significant nephrotic range proteinuria  Doing better  Improved SOB, edema  No N/V  Renal biopsy -DMN  Review of Systems:  Review of Systems   Constitutional: Positive for malaise/fatigue. Negative for fever and chills.   HENT: Negative for congestion, nosebleeds and sore throat.    Eyes: Negative.    Respiratory: Negative for cough, hemoptysis and wheezing.    Cardiovascular: Negative for chest pain, palpitations, orthopnea and leg swelling.   Gastrointestinal: Negative for heartburn, nausea, vomiting, abdominal pain and diarrhea.   Genitourinary: Negative for dysuria, urgency, frequency, hematuria and flank pain.   Musculoskeletal: Negative for myalgias, back pain, joint pain and neck pain.   Skin: Negative for itching and rash.   Neurological: Negative for dizziness, sensory change, focal weakness and headaches.       Physical Exam:  Physical Exam   Constitutional: She is oriented to person, place, and time. She appears well-developed and well-nourished. No distress.   HENT:   Head: Normocephalic and atraumatic.   Mouth/Throat: Oropharynx is clear and moist.   Eyes: Conjunctivae and EOM are normal. Pupils are equal, round, and reactive to light.   Neck: Normal range of motion. Neck supple.   Cardiovascular: Normal rate and regular rhythm.  Exam reveals no gallop and no friction rub.    Pulmonary/Chest: Effort normal and breath sounds normal. No respiratory distress. She has no wheezes. She has no rales.   Abdominal: Soft. Bowel sounds are normal. She exhibits no distension and no mass. There is no tenderness.   Musculoskeletal: She exhibits no edema.   Neurological: She is alert and oriented to person, place, and time. No cranial nerve deficit. Coordination normal.   Skin: Skin is  warm. No rash noted. No erythema.   Nursing note and vitals reviewed.      Labs:        Invalid input(s): [id number]  Recent Labs      17   TROPONINI  <0.01     Recent Labs      17   SODIUM  135  139  135   POTASSIUM  4.3  4.2  4.1   CHLORIDE  106  107  107   CO2  23  23  21   BUN  31*  33*  31*   CREATININE  3.55*  3.58*  3.17*   PHOSPHORUS  5.4*   --    --    CALCIUM  8.4*  8.2*  8.2*     Recent Labs      17   ALTSGPT   --    --   21   ASTSGOT   --    --   23   ALKPHOSPHAT   --    --   109*   TBILIRUBIN   --    --   0.2   GLUCOSE  94  59*  112*     Recent Labs      17   RBC   --   3.66*   HEMOGLOBIN   --   10.3*   HEMATOCRIT   --   31.2*   PLATELETCT   --   239   PROTHROMBTM  13.5   --    APTT  55.3*   --    INR  1.00   --      Recent Labs      17   WBC  8.8   NEUTSPOLYS  59.60   LYMPHOCYTES  25.90   MONOCYTES  10.30   EOSINOPHILS  3.50   BASOPHILS  0.50   ASTSGOT  23   ALTSGPT  21   ALKPHOSPHAT  109*   TBILIRUBIN  0.2           Hemodynamics:  Temp (24hrs), Av.7 °C (98.1 °F), Min:36.3 °C (97.4 °F), Max:37.1 °C (98.7 °F)  Temperature: 36.3 °C (97.4 °F)  Pulse  Av.6  Min: 61  Max: 90   Blood Pressure: 156/72 mmHg     Respiratory:    Respiration: 16, Pulse Oximetry: 94 %        RML Breath Sounds: Diminished, RLL Breath Sounds: Diminished, LLL Breath Sounds: Diminished  Fluids:    Intake/Output Summary (Last 24 hours) at 17 1651  Last data filed at 17 0820   Gross per 24 hour   Intake   1566 ml   Output      0 ml   Net   1566 ml        GI/Nutrition:  Orders Placed This Encounter   Procedures   • DIET ORDER     Standing Status: Standing      Number of Occurrences: 1      Standing Expiration Date:      Order Specific Question:  Diet:     Answer:  Diabetic [3]     Order Specific Question:  Calorie modifications:     Answer:  1800 kcals [4]      Medical Decision Making, by Problem:  Active Hospital Problems    Diagnosis   • Anasarca associated with disorder of kidney [N04.9]       Labs reviewed and Medications reviewed                  Assessment and plan    1.DOTTIE/CKD III-IV -worsening creat level - better    nephrotic range proteinuria  Due to DMN per biopsy  2.HTN: BP well controlled  3.Electrolytes: well controlled.  4.Anemia: Hb to montior  5.Volume ; edema better       Recs: low Na diet             No need for emergent dialysis             Avoid nephrotoxic agents                       D/c home AM if Hb stable, no hematuria, creat better             F/u in Nephrology Clinic

## 2017-03-31 NOTE — CARE PLAN
Problem: Pain Management  Goal: Pain level will decrease to patient’s comfort goal    03/30/17 1846   OTHER   Nurse Pain Scale 0 - 10  4   Non Verbal Scale  Calm   PAIN MEDICATIONS GIVEN WHEN NEEDED    Problem: Safety  Goal: Will remain free from injury  Outcome: PROGRESSING AS EXPECTED  PT REMAINED FREE FROM INJURY DURING SHIFT  Intervention: Provide assistance with mobility    03/30/17 1846   OTHER   Assistance / Tolerance * * Assistance;[facility]istance of [facility]

## 2017-03-31 NOTE — PROGRESS NOTES
"Patient complained of new onset blurriness to vision. Glucose checked at 115. Patient also complaining of left sided chest and shoulder pain, which she describes as \"poking.\" TATUM Felix notified via telephone. Orders received.   "

## 2017-03-31 NOTE — CARE PLAN
Problem: Pain Management  Goal: Pain level will decrease to patient’s comfort goal  Outcome: PROGRESSING AS EXPECTED  Patient denies pain at this time.    Problem: Infection  Goal: Will remain free from infection  Outcome: PROGRESSING AS EXPECTED  No new signs or symptoms of infection at this time.

## 2017-03-31 NOTE — PROGRESS NOTES
Hospital Medicine Progress Note, Adult, Complex               Author: Jason Adan MD Date & Time created: 3/31/2017  10:26 AM     Interval History:  Admitted for ARF, edema, uncontrolled DM.  .     on medical floor    Afebrile    Denies pain    Kidney biopsy done yesterday        Review of Systems:  Review of Systems   Constitutional: Negative for fever, chills and weight loss.   HENT: Negative for ear pain and hearing loss.    Respiratory: Negative for cough and shortness of breath.    Cardiovascular: Positive for leg swelling. Negative for chest pain and palpitations.   Gastrointestinal: Negative for nausea, vomiting and abdominal pain.   Genitourinary: Negative for dysuria, urgency and frequency.   Neurological: Negative for dizziness, tingling, tremors, sensory change and speech change.   Psychiatric/Behavioral: The patient does not have insomnia.    All other systems reviewed and are negative.      Physical Exam:  Physical Exam   Constitutional: She is oriented to person, place, and time. No distress.   Eyes: Left eye exhibits no discharge.   Neck: No tracheal deviation present. No thyromegaly present.   Cardiovascular: Normal rate.  Exam reveals no gallop and no friction rub.    No murmur heard.  Pulmonary/Chest: No respiratory distress. She has no wheezes. She has no rales.   Abdominal: She exhibits no distension. There is no tenderness.   Musculoskeletal: She exhibits edema. She exhibits no tenderness.   Neurological: She is alert and oriented to person, place, and time. No cranial nerve deficit. Coordination normal.         .   Labs:        Invalid input(s): [id number]  Recent Labs      03/31/17   0059   TROPONINI  <0.01     Recent Labs      03/29/17   0402  03/30/17   0316  03/31/17   0059   SODIUM  135  139  135   POTASSIUM  4.3  4.2  4.1   CHLORIDE  106  107  107   CO2  23  23  21   BUN  31*  33*  31*   CREATININE  3.55*  3.58*  3.17*   PHOSPHORUS  5.4*   --    --    CALCIUM  8.4*  8.2*  8.2*      Recent Labs      17   0402  17   0316  17   0059   ALTSGPT   --    --   21   ASTSGOT   --    --   23   ALKPHOSPHAT   --    --   109*   TBILIRUBIN   --    --   0.2   GLUCOSE  94  59*  112*     Recent Labs      17   0402  17   0059   RBC   --   3.66*   HEMOGLOBIN   --   10.3*   HEMATOCRIT   --   31.2*   PLATELETCT   --   239   PROTHROMBTM  13.5   --    APTT  55.3*   --    INR  1.00   --      Recent Labs      17   0059   WBC  8.8   NEUTSPOLYS  59.60   LYMPHOCYTES  25.90   MONOCYTES  10.30   EOSINOPHILS  3.50   BASOPHILS  0.50   ASTSGOT  23   ALTSGPT  21   ALKPHOSPHAT  109*   TBILIRUBIN  0.2           Hemodynamics:  Temp (24hrs), Av.7 °C (98.1 °F), Min:36.5 °C (97.7 °F), Max:37.1 °C (98.7 °F)  Temperature: 36.7 °C (98 °F)  Pulse  Av.8  Min: 61  Max: 90   Blood Pressure: (!) 180/80 mmHg     Respiratory:    Respiration: 16, Pulse Oximetry: 95 %, O2 Daily Delivery Respiratory : Room Air with O2 Available        RUL Breath Sounds: Clear, RML Breath Sounds: Diminished, RLL Breath Sounds: Diminished, KHRIS Breath Sounds: Clear, LLL Breath Sounds: Diminished  Fluids:    Intake/Output Summary (Last 24 hours) at 17 1026  Last data filed at 17 0600   Gross per 24 hour   Intake   1516 ml   Output      0 ml   Net   1516 ml        GI/Nutrition:  Orders Placed This Encounter   Procedures   • DIET ORDER     Standing Status: Standing      Number of Occurrences: 1      Standing Expiration Date:      Order Specific Question:  Diet:     Answer:  Diabetic [3]     Order Specific Question:  Calorie modifications:     Answer:  1800 kcals [4]     Medical Decision Making, by Problem:  Active Hospital Problems    Diagnosis   • Anasarca associated with disorder of kidney [N04.9] - creatinine elevated but stable Echo is WNL.        Lower extremity edema- DVT eval neg.  MARIE/SCD's.      Obesity - encourage decreased Kcal diet.      DM 2 - uncontrolled.  Home regimen and cover c  ISS.    Hypoglycemia- resolved  Stop d5wt      CKD 3/ARF -   Renal biopsy pending.  Neph on.      FRANK - encourage CPAP compliance.    Hep C - outpatient follow up.    Insomnia - outpatient follow up.    Stable issues - med hx (HTN, HLD, GERD, hypothyroid), preventives, hyponatremia.  Dispo -home when stable    Will d/w renal MD if she can go home and follow up with them as outpatient      Labs reviewed and Medications reviewed  Jhaveri catheter: No Jhaveri      DVT Prophylaxis: Heparin        Assessed for rehab: Patient unable to tolerate rehabilitation therapeutic regimen

## 2017-03-31 NOTE — PROGRESS NOTES
Patient reports that glucose reading is 30 minutes postprandial. RN to recheck glucose again before administering insulin.

## 2017-04-01 VITALS
SYSTOLIC BLOOD PRESSURE: 154 MMHG | BODY MASS INDEX: 33.26 KG/M2 | HEIGHT: 61 IN | WEIGHT: 176.15 LBS | HEART RATE: 77 BPM | DIASTOLIC BLOOD PRESSURE: 71 MMHG | TEMPERATURE: 98.8 F | RESPIRATION RATE: 18 BRPM | OXYGEN SATURATION: 95 %

## 2017-04-01 LAB
ANION GAP SERPL CALC-SCNC: 8 MMOL/L (ref 0–11.9)
BASOPHILS # BLD AUTO: 0.5 % (ref 0–1.8)
BASOPHILS # BLD: 0.04 K/UL (ref 0–0.12)
BUN SERPL-MCNC: 32 MG/DL (ref 8–22)
CALCIUM SERPL-MCNC: 8.6 MG/DL (ref 8.5–10.5)
CHLORIDE SERPL-SCNC: 107 MMOL/L (ref 96–112)
CO2 SERPL-SCNC: 20 MMOL/L (ref 20–33)
CREAT SERPL-MCNC: 2.97 MG/DL (ref 0.5–1.4)
EOSINOPHIL # BLD AUTO: 0.24 K/UL (ref 0–0.51)
EOSINOPHIL NFR BLD: 3 % (ref 0–6.9)
ERYTHROCYTE [DISTWIDTH] IN BLOOD BY AUTOMATED COUNT: 38.3 FL (ref 35.9–50)
GFR SERPL CREATININE-BSD FRML MDRD: 16 ML/MIN/1.73 M 2
GLUCOSE BLD-MCNC: 186 MG/DL (ref 65–99)
GLUCOSE BLD-MCNC: 217 MG/DL (ref 65–99)
GLUCOSE SERPL-MCNC: 213 MG/DL (ref 65–99)
HCT VFR BLD AUTO: 32.1 % (ref 37–47)
HGB BLD-MCNC: 10.4 G/DL (ref 12–16)
IMM GRANULOCYTES # BLD AUTO: 0.05 K/UL (ref 0–0.11)
IMM GRANULOCYTES NFR BLD AUTO: 0.6 % (ref 0–0.9)
LYMPHOCYTES # BLD AUTO: 1.84 K/UL (ref 1–4.8)
LYMPHOCYTES NFR BLD: 23.1 % (ref 22–41)
MCH RBC QN AUTO: 27.8 PG (ref 27–33)
MCHC RBC AUTO-ENTMCNC: 32.4 G/DL (ref 33.6–35)
MCV RBC AUTO: 85.8 FL (ref 81.4–97.8)
MONOCYTES # BLD AUTO: 0.72 K/UL (ref 0–0.85)
MONOCYTES NFR BLD AUTO: 9 % (ref 0–13.4)
NEUTROPHILS # BLD AUTO: 5.08 K/UL (ref 2–7.15)
NEUTROPHILS NFR BLD: 63.8 % (ref 44–72)
NRBC # BLD AUTO: 0 K/UL
NRBC BLD AUTO-RTO: 0 /100 WBC
PLATELET # BLD AUTO: 250 K/UL (ref 164–446)
PMV BLD AUTO: 10.2 FL (ref 9–12.9)
POTASSIUM SERPL-SCNC: 4.5 MMOL/L (ref 3.6–5.5)
RBC # BLD AUTO: 3.74 M/UL (ref 4.2–5.4)
SODIUM SERPL-SCNC: 135 MMOL/L (ref 135–145)
WBC # BLD AUTO: 8 K/UL (ref 4.8–10.8)

## 2017-04-01 PROCEDURE — 36415 COLL VENOUS BLD VENIPUNCTURE: CPT

## 2017-04-01 PROCEDURE — 85025 COMPLETE CBC W/AUTO DIFF WBC: CPT

## 2017-04-01 PROCEDURE — 80048 BASIC METABOLIC PNL TOTAL CA: CPT

## 2017-04-01 PROCEDURE — 99239 HOSP IP/OBS DSCHRG MGMT >30: CPT | Performed by: HOSPITALIST

## 2017-04-01 PROCEDURE — 700102 HCHG RX REV CODE 250 W/ 637 OVERRIDE(OP): Performed by: HOSPITALIST

## 2017-04-01 PROCEDURE — 82962 GLUCOSE BLOOD TEST: CPT | Mod: 91

## 2017-04-01 PROCEDURE — A9270 NON-COVERED ITEM OR SERVICE: HCPCS | Performed by: HOSPITALIST

## 2017-04-01 PROCEDURE — 700111 HCHG RX REV CODE 636 W/ 250 OVERRIDE (IP): Performed by: HOSPITALIST

## 2017-04-01 RX ORDER — CARVEDILOL 6.25 MG/1
6.25 TABLET ORAL 2 TIMES DAILY WITH MEALS
Qty: 60 TAB | Refills: 3 | Status: SHIPPED | OUTPATIENT
Start: 2017-04-01 | End: 2017-11-01

## 2017-04-01 RX ORDER — AMLODIPINE BESYLATE 10 MG/1
10 TABLET ORAL 2 TIMES DAILY
Qty: 30 TAB | Refills: 3 | Status: SHIPPED | OUTPATIENT
Start: 2017-04-01

## 2017-04-01 RX ADMIN — OMEPRAZOLE 40 MG: 20 CAPSULE, DELAYED RELEASE ORAL at 11:04

## 2017-04-01 RX ADMIN — HEPARIN SODIUM 5000 UNITS: 5000 INJECTION, SOLUTION INTRAVENOUS; SUBCUTANEOUS at 05:08

## 2017-04-01 RX ADMIN — CARVEDILOL 6.25 MG: 6.25 TABLET, FILM COATED ORAL at 11:05

## 2017-04-01 RX ADMIN — INSULIN LISPRO 2 UNITS: 100 INJECTION, SOLUTION INTRAVENOUS; SUBCUTANEOUS at 05:12

## 2017-04-01 RX ADMIN — LACTOBACILLUS ACIDOPHILUS / LACTOBACILLUS BULGARICUS 1 PACKET: 100 MILLION CFU STRENGTH GRANULES at 11:02

## 2017-04-01 RX ADMIN — INSULIN LISPRO 3 UNITS: 100 INJECTION, SOLUTION INTRAVENOUS; SUBCUTANEOUS at 11:45

## 2017-04-01 RX ADMIN — STANDARDIZED SENNA CONCENTRATE AND DOCUSATE SODIUM 2 TABLET: 8.6; 5 TABLET, FILM COATED ORAL at 11:06

## 2017-04-01 RX ADMIN — INSULIN GLARGINE 40 UNITS: 100 INJECTION, SOLUTION SUBCUTANEOUS at 11:06

## 2017-04-01 RX ADMIN — LORATADINE 5 MG: 10 TABLET ORAL at 11:04

## 2017-04-01 NOTE — DISCHARGE INSTRUCTIONS
Discharge Instructions    Discharged to home by car with relative. Discharged via walking, hospital escort: Refused.  Special equipment needed: Not Applicable    Be sure to schedule a follow-up appointment with your primary care doctor or any specialists as instructed.     Discharge Plan:   Influenza Vaccine Indication: Not indicated: Previously immunized this influenza season and > 8 years of age    I understand that a diet low in cholesterol, fat, and sodium is recommended for good health. Unless I have been given specific instructions below for another diet, I accept this instruction as my diet prescription.   Other diet: Low sugar    Special Instructions: None    · Is patient discharged on Warfarin / Coumadin?   No     · Is patient Post Blood Transfusion?  No    Kidney Disease, Adult  The kidneys are two organs that lie on either side of the spine between the middle of the back and the front of the abdomen. The kidneys:   · Remove wastes and extra water from the blood.    · Produce important hormones. These regulate blood pressure, help keep bones strong, and help create red blood cells.    · Balance the fluids and chemicals in the blood and tissues.  Kidney disease occurs when the kidneys are damaged. Kidney damage may be sudden (acute) or develop over a long period (chronic). A small amount of damage may not cause problems, but a large amount of damage may make it difficult or impossible for the kidneys to work the way they should. Early detection and treatment of kidney disease may prevent kidney damage from becoming permanent or getting worse. Some kidney diseases are curable, but most are not. Many people with kidney disease are able to control the disease and live a normal life.   TYPES OF KIDNEY DISEASE  · Acute kidney injury. Acute kidney injury occurs when there is sudden damage to the kidneys.  · Chronic kidney disease. Chronic kidney disease occurs when the kidneys are damaged over a long  period.  · End-stage kidney disease. End-stage kidney disease occurs when the kidneys are so damaged that they stop working. In end-stage kidney disease, the kidneys cannot get better.  CAUSES  Any condition, disease, or event that damages the kidneys may cause kidney disease.  Acute kidney injury.  · A problem with blood flow to the kidneys. This may be caused by:    · Blood loss.    · Heart disease.    · Severe burns.    · Liver disease.  · Direct damage to the kidneys. This may be caused by:  · Some medicines.    · A kidney infection.    · Poisoning or consuming toxic substances.    · A surgical wound.    · A blow to the kidney area.    · A problem with urine flow. This may be caused by:    · Cancer.    · Kidney stones.    · An enlarged prostate.  Chronic kidney disease. The most common causes of chronic kidney disease are diabetes and high blood pressure (hypertension). Chronic kidney disease may also be caused by:   · Diseases that cause the filtering units of the kidneys to become inflamed.    · Diseases that affect the immune system.    · Genetic diseases.    · Medicines that damage the kidneys, such as anti-inflammatory medicines.    · Poisoning or exposure to toxic substances.    · A reoccurring kidney or urinary infection.    · A problem with urine flow. This may be caused by:  · Cancer.    · Kidney stones.    · An enlarged prostate in males.  End-stage kidney disease. This kidney disease usually occurs when a chronic kidney disease gets worse. It may also occur after acute kidney injury.   SYMPTOMS   · Swelling (edema) of the legs, ankles, or feet.    · Tiredness (lethargy).    · Nausea or vomiting.    · Confusion.    · Problems with urination, such as:    · Painful or burning feeling during urination.    · Decreased urine production.  · Bloody urine.    · Frequent urination, especially at night.  · Hypertension.   · Muscle twitches and cramps.    · Shortness of breath.    · Persistent itchiness.     · Loss of appetite.  · Metallic taste in the mouth.    · Weakness.    · Seizures.    · Chest pain or pressure.    · Trouble sleeping.    · Headaches.    · Abnormally dark or light skin.    · Numbness in the hands or feet.    · Easy bruising.    · Frequent hiccups.    · Menstruation stops.  Sometimes, no symptoms are present.    DIAGNOSIS   Kidney disease may be detected and diagnosed by tests, including blood, urine, imaging, or kidney biopsy tests.   TREATMENT   Acute kidney injury. Treatment of acute kidney injury varies depending on the cause and severity of the kidney damage. In mild cases, no treatment may be needed. The kidneys may heal on their own. If acute kidney injury is more severe, your caregiver will treat the cause of the kidney damage, help the kidneys heal, and prevent complications from occurring. Severe cases may require a procedure to remove toxic wastes from the body (dialysis) or surgery to repair kidney damage. Surgery may involve:   · Repair of a torn kidney.    · Removal of an obstruction.    Most of the time, you will need to stay overnight at the hospital.   Chronic kidney disease. Most chronic kidney diseases cannot be cured. Treatment usually involves relieving symptoms and preventing or slowing the progression of the disease. Treatment may include:   · A special diet. You may need to avoid alcohol and foods that:    · Have added salt.    · Are high in potassium.    · Are high in protein.    · Medicines. These may:    · Lower blood pressure.    · Relieve anemia.    · Relieve swelling.    · Protect the bones.    End-stage kidney disease. End-stage kidney disease is life-threatening and must be treated immediately. There are two treatments for end-stage kidney disease:   · Dialysis.    · Receiving a new kidney (kidney transplant).  Both of these treatments have serious risks and consequences. In addition to having dialysis or a kidney transplant, you may need to take medicines to  control hypertension and cholesterol and to decrease phosphorus levels in your blood.  LENGTH OF ILLNESS  · Acute kidney injury. The length of this disease varies greatly from person to person. Exactly how long it lasts depends on the cause of the kidney damage. Acute kidney injury may develop into chronic kidney disease or end-stage kidney disease.  · Chronic kidney disease. This disease usually lasts a lifetime. Chronic kidney disease may worsen over time to become end-stage kidney disease. The time it takes for end-stage kidney disease to develop varies from person to person.  · End-stage kidney disease. This disease lasts until a kidney transplant is performed.  PREVENTION   Kidney disease can sometimes be prevented. If you have diabetes, hypertension, or any other condition that may lead to kidney disease, you should try to prevent kidney disease with:   · An appropriate diet.  · Medicine.  · Lifestyle changes.  FOR MORE INFORMATION   American Association of Kidney Patients: www.aakp.org   National Kidney Foundation: www.kidney.org   American Kidney Fund: www.akfinc.org   Life Options Rehabilitation Program: www.lifeoptions.org and www.kidneyschool.org   Document Released: 12/18/2006 Document Revised: 12/04/2013 Document Reviewed: 08/16/2013  ExitCare® Patient Information ©2014 amaysim.      Depression / Suicide Risk    As [facility] are [facility], it is important to learn how to keep safe from harming yourself.    Recognize the warning signs:  · Abrupt changes in personality, positive or negative- including increase in energy   · Giving away possessions  · Change in eating patterns- significant weight changes-  positive or negative  · Change in sleeping patterns- unable to sleep or sleeping all the time   · Unwillingness or inability to communicate  · Depression  · Unusual sadness, discouragement and loneliness  · Talk of wanting to die  · Neglect of personal  appearance   · Rebelliousness- reckless behavior  · Withdrawal from people/activities they love  · Confusion- inability to concentrate     If you or a loved one observes any of these behaviors or has concerns about self-harm, here's what you can do:  · Talk about it- your feelings and reasons for harming yourself  · Remove any means that you might use to hurt yourself (examples: pills, rope, extension cords, firearm)  · Get professional help from the community (Mental Health, Substance Abuse, psychological counseling)  · Do not be alone:Call your Safe Contact- someone whom you trust who will be there for you.  · Call your local CRISIS HOTLINE 107-7536 or 221-259-2897  · Call your local Children's Mobile Crisis Response Team [place] (019) 911-8909 or www.LoungeUp  · Call the toll free National Suicide Prevention Hotlines   · National Suicide Prevention Lifeline 219-316-DQYV (5957)  · National Hope Line Network 800-SUICIDE (679-3463)

## 2017-04-01 NOTE — PROGRESS NOTES
Patient alert and oriented.  Blood sugars well controlled, Lantus held.   Some hypertension, scheduled coreg effective.  Up independently.   Anticipate discharge to home pending clearance from nephrology.

## 2017-04-02 NOTE — PROGRESS NOTES
Patient discharged to home.   Gave patient instructions and prescriptions.  Will make follow-up appointment with Nephrology, patient will call on Monday.

## 2017-04-03 ENCOUNTER — TELEPHONE (OUTPATIENT)
Dept: CARDIOLOGY | Facility: MEDICAL CENTER | Age: 62
End: 2017-04-03

## 2017-04-03 NOTE — TELEPHONE ENCOUNTER
Spoke with patient who had an echocardiogram 3/28/17 as in patient.  Appointment for echo 4/12/17 @ 1:15pm canceled.  She will maintain appointment for PET Scan scheduled the same day @ 10am.

## 2017-04-03 NOTE — TELEPHONE ENCOUNTER
----- Message from Carla Osullivan sent at 4/3/2017  1:26 PM PDT -----  Regarding: Question about Echo  TT/Sharri,    Patient states she had Echo done in hospital during recent hospital stay, wants to know if TT would like her to keep upcoming Echo appt 4/12? She can be reached at [phone] for call back.

## 2017-04-07 ENCOUNTER — APPOINTMENT (OUTPATIENT)
Dept: NEPHROLOGY | Facility: MEDICAL CENTER | Age: 62
End: 2017-04-07
Payer: MEDICAID

## 2017-04-12 ENCOUNTER — APPOINTMENT (OUTPATIENT)
Dept: RADIOLOGY | Facility: MEDICAL CENTER | Age: 62
End: 2017-04-12
Attending: INTERNAL MEDICINE
Payer: MEDICAID

## 2017-04-28 ENCOUNTER — APPOINTMENT (OUTPATIENT)
Dept: RADIOLOGY | Facility: MEDICAL CENTER | Age: 62
DRG: 189 | End: 2017-04-28
Attending: EMERGENCY MEDICINE
Payer: MEDICAID

## 2017-04-28 ENCOUNTER — HOSPITAL ENCOUNTER (INPATIENT)
Facility: MEDICAL CENTER | Age: 62
LOS: 3 days | DRG: 189 | End: 2017-05-01
Attending: EMERGENCY MEDICINE | Admitting: HOSPITALIST
Payer: MEDICAID

## 2017-04-28 ENCOUNTER — RESOLUTE PROFESSIONAL BILLING HOSPITAL PROF FEE (OUTPATIENT)
Dept: HOSPITALIST | Facility: MEDICAL CENTER | Age: 62
End: 2017-04-28
Payer: MEDICAID

## 2017-04-28 DIAGNOSIS — I50.9 ACUTE CONGESTIVE HEART FAILURE, UNSPECIFIED CONGESTIVE HEART FAILURE TYPE: ICD-10-CM

## 2017-04-28 DIAGNOSIS — R06.02 SHORTNESS OF BREATH: ICD-10-CM

## 2017-04-28 DIAGNOSIS — R60.0 BILATERAL LEG EDEMA: ICD-10-CM

## 2017-04-28 PROBLEM — J81.1 PULMONARY EDEMA: Status: ACTIVE | Noted: 2017-04-28

## 2017-04-28 PROBLEM — R60.1 ANASARCA: Status: ACTIVE | Noted: 2017-04-28

## 2017-04-28 PROBLEM — Z86.19 HISTORY OF HEPATITIS C: Status: ACTIVE | Noted: 2017-04-28

## 2017-04-28 PROBLEM — E11.9 TYPE 2 DIABETES MELLITUS (HCC): Status: ACTIVE | Noted: 2017-04-28

## 2017-04-28 PROBLEM — N17.9 ACUTE ON CHRONIC RENAL FAILURE (HCC): Status: ACTIVE | Noted: 2017-04-28

## 2017-04-28 PROBLEM — N18.9 ACUTE ON CHRONIC RENAL FAILURE (HCC): Status: ACTIVE | Noted: 2017-04-28

## 2017-04-28 LAB
ALBUMIN SERPL BCP-MCNC: 3.6 G/DL (ref 3.2–4.9)
ALBUMIN/GLOB SERPL: 1.1 G/DL
ALP SERPL-CCNC: 147 U/L (ref 30–99)
ALT SERPL-CCNC: 14 U/L (ref 2–50)
ANION GAP SERPL CALC-SCNC: 11 MMOL/L (ref 0–11.9)
APTT PPP: 28.7 SEC (ref 24.7–36)
AST SERPL-CCNC: 15 U/L (ref 12–45)
BASOPHILS # BLD AUTO: 0.6 % (ref 0–1.8)
BASOPHILS # BLD: 0.06 K/UL (ref 0–0.12)
BILIRUB SERPL-MCNC: 0.4 MG/DL (ref 0.1–1.5)
BNP SERPL-MCNC: 153 PG/ML (ref 0–100)
BUN SERPL-MCNC: 44 MG/DL (ref 8–22)
CALCIUM SERPL-MCNC: 8.6 MG/DL (ref 8.5–10.5)
CHLORIDE SERPL-SCNC: 110 MMOL/L (ref 96–112)
CO2 SERPL-SCNC: 20 MMOL/L (ref 20–33)
CREAT SERPL-MCNC: 3.09 MG/DL (ref 0.5–1.4)
EKG IMPRESSION: NORMAL
EOSINOPHIL # BLD AUTO: 0.25 K/UL (ref 0–0.51)
EOSINOPHIL NFR BLD: 2.7 % (ref 0–6.9)
ERYTHROCYTE [DISTWIDTH] IN BLOOD BY AUTOMATED COUNT: 38.3 FL (ref 35.9–50)
GFR SERPL CREATININE-BSD FRML MDRD: 15 ML/MIN/1.73 M 2
GLOBULIN SER CALC-MCNC: 3.2 G/DL (ref 1.9–3.5)
GLUCOSE BLD-MCNC: 112 MG/DL (ref 65–99)
GLUCOSE BLD-MCNC: 128 MG/DL (ref 65–99)
GLUCOSE SERPL-MCNC: 98 MG/DL (ref 65–99)
HCT VFR BLD AUTO: 33.2 % (ref 37–47)
HGB BLD-MCNC: 10.9 G/DL (ref 12–16)
IMM GRANULOCYTES # BLD AUTO: 0.04 K/UL (ref 0–0.11)
IMM GRANULOCYTES NFR BLD AUTO: 0.4 % (ref 0–0.9)
INR PPP: 0.9 (ref 0.87–1.13)
LIPASE SERPL-CCNC: 11 U/L (ref 11–82)
LYMPHOCYTES # BLD AUTO: 1.54 K/UL (ref 1–4.8)
LYMPHOCYTES NFR BLD: 16.5 % (ref 22–41)
MAGNESIUM SERPL-MCNC: 2 MG/DL (ref 1.5–2.5)
MCH RBC QN AUTO: 28.2 PG (ref 27–33)
MCHC RBC AUTO-ENTMCNC: 32.8 G/DL (ref 33.6–35)
MCV RBC AUTO: 86 FL (ref 81.4–97.8)
MONOCYTES # BLD AUTO: 0.78 K/UL (ref 0–0.85)
MONOCYTES NFR BLD AUTO: 8.4 % (ref 0–13.4)
NEUTROPHILS # BLD AUTO: 6.67 K/UL (ref 2–7.15)
NEUTROPHILS NFR BLD: 71.4 % (ref 44–72)
NRBC # BLD AUTO: 0 K/UL
NRBC BLD AUTO-RTO: 0 /100 WBC
PLATELET # BLD AUTO: 321 K/UL (ref 164–446)
PMV BLD AUTO: 10 FL (ref 9–12.9)
POTASSIUM SERPL-SCNC: 4.7 MMOL/L (ref 3.6–5.5)
PROT SERPL-MCNC: 6.8 G/DL (ref 6–8.2)
PROTHROMBIN TIME: 12.4 SEC (ref 12–14.6)
RBC # BLD AUTO: 3.86 M/UL (ref 4.2–5.4)
SODIUM SERPL-SCNC: 141 MMOL/L (ref 135–145)
TROPONIN I SERPL-MCNC: <0.01 NG/ML (ref 0–0.04)
WBC # BLD AUTO: 9.3 K/UL (ref 4.8–10.8)

## 2017-04-28 PROCEDURE — A9270 NON-COVERED ITEM OR SERVICE: HCPCS | Performed by: HOSPITALIST

## 2017-04-28 PROCEDURE — 82962 GLUCOSE BLOOD TEST: CPT | Mod: 91

## 2017-04-28 PROCEDURE — 84484 ASSAY OF TROPONIN QUANT: CPT

## 2017-04-28 PROCEDURE — 700102 HCHG RX REV CODE 250 W/ 637 OVERRIDE(OP): Performed by: HOSPITALIST

## 2017-04-28 PROCEDURE — 99223 1ST HOSP IP/OBS HIGH 75: CPT | Performed by: HOSPITALIST

## 2017-04-28 PROCEDURE — 94760 N-INVAS EAR/PLS OXIMETRY 1: CPT

## 2017-04-28 PROCEDURE — 71010 DX-CHEST-LIMITED (1 VIEW): CPT

## 2017-04-28 PROCEDURE — 700102 HCHG RX REV CODE 250 W/ 637 OVERRIDE(OP): Performed by: INTERNAL MEDICINE

## 2017-04-28 PROCEDURE — 93005 ELECTROCARDIOGRAM TRACING: CPT

## 2017-04-28 PROCEDURE — 85730 THROMBOPLASTIN TIME PARTIAL: CPT

## 2017-04-28 PROCEDURE — 94640 AIRWAY INHALATION TREATMENT: CPT

## 2017-04-28 PROCEDURE — 83690 ASSAY OF LIPASE: CPT

## 2017-04-28 PROCEDURE — 83735 ASSAY OF MAGNESIUM: CPT

## 2017-04-28 PROCEDURE — 85025 COMPLETE CBC W/AUTO DIFF WBC: CPT

## 2017-04-28 PROCEDURE — 83880 ASSAY OF NATRIURETIC PEPTIDE: CPT

## 2017-04-28 PROCEDURE — 700111 HCHG RX REV CODE 636 W/ 250 OVERRIDE (IP): Performed by: HOSPITALIST

## 2017-04-28 PROCEDURE — 80053 COMPREHEN METABOLIC PANEL: CPT

## 2017-04-28 PROCEDURE — 99285 EMERGENCY DEPT VISIT HI MDM: CPT

## 2017-04-28 PROCEDURE — 85610 PROTHROMBIN TIME: CPT

## 2017-04-28 PROCEDURE — A9270 NON-COVERED ITEM OR SERVICE: HCPCS | Performed by: INTERNAL MEDICINE

## 2017-04-28 PROCEDURE — 700111 HCHG RX REV CODE 636 W/ 250 OVERRIDE (IP): Performed by: EMERGENCY MEDICINE

## 2017-04-28 PROCEDURE — 770006 HCHG ROOM/CARE - MED/SURG/GYN SEMI*

## 2017-04-28 PROCEDURE — 700101 HCHG RX REV CODE 250: Performed by: EMERGENCY MEDICINE

## 2017-04-28 PROCEDURE — 96374 THER/PROPH/DIAG INJ IV PUSH: CPT

## 2017-04-28 PROCEDURE — 93005 ELECTROCARDIOGRAM TRACING: CPT | Performed by: EMERGENCY MEDICINE

## 2017-04-28 RX ORDER — OMEPRAZOLE 20 MG/1
20 CAPSULE, DELAYED RELEASE ORAL
Status: DISCONTINUED | OUTPATIENT
Start: 2017-04-28 | End: 2017-05-01 | Stop reason: HOSPADM

## 2017-04-28 RX ORDER — AMLODIPINE BESYLATE 5 MG/1
10 TABLET ORAL 2 TIMES DAILY
Status: DISCONTINUED | OUTPATIENT
Start: 2017-04-28 | End: 2017-04-28

## 2017-04-28 RX ORDER — DEXTROSE MONOHYDRATE 25 G/50ML
25 INJECTION, SOLUTION INTRAVENOUS
Status: DISCONTINUED | OUTPATIENT
Start: 2017-04-28 | End: 2017-05-01 | Stop reason: HOSPADM

## 2017-04-28 RX ORDER — CARVEDILOL 6.25 MG/1
6.25 TABLET ORAL 2 TIMES DAILY WITH MEALS
Status: DISCONTINUED | OUTPATIENT
Start: 2017-04-28 | End: 2017-05-01 | Stop reason: HOSPADM

## 2017-04-28 RX ORDER — BISACODYL 10 MG
10 SUPPOSITORY, RECTAL RECTAL
Status: DISCONTINUED | OUTPATIENT
Start: 2017-04-28 | End: 2017-05-01 | Stop reason: HOSPADM

## 2017-04-28 RX ORDER — POLYETHYLENE GLYCOL 3350 17 G/17G
1 POWDER, FOR SOLUTION ORAL
Status: DISCONTINUED | OUTPATIENT
Start: 2017-04-28 | End: 2017-05-01 | Stop reason: HOSPADM

## 2017-04-28 RX ORDER — ONDANSETRON 4 MG/1
4 TABLET, ORALLY DISINTEGRATING ORAL EVERY 4 HOURS PRN
Status: DISCONTINUED | OUTPATIENT
Start: 2017-04-28 | End: 2017-05-01 | Stop reason: HOSPADM

## 2017-04-28 RX ORDER — ONDANSETRON 2 MG/ML
4 INJECTION INTRAMUSCULAR; INTRAVENOUS EVERY 4 HOURS PRN
Status: DISCONTINUED | OUTPATIENT
Start: 2017-04-28 | End: 2017-05-01 | Stop reason: HOSPADM

## 2017-04-28 RX ORDER — PROMETHAZINE HYDROCHLORIDE 25 MG/1
12.5-25 SUPPOSITORY RECTAL EVERY 4 HOURS PRN
Status: DISCONTINUED | OUTPATIENT
Start: 2017-04-28 | End: 2017-05-01 | Stop reason: HOSPADM

## 2017-04-28 RX ORDER — FUROSEMIDE 10 MG/ML
40 INJECTION INTRAMUSCULAR; INTRAVENOUS
Status: DISCONTINUED | OUTPATIENT
Start: 2017-04-28 | End: 2017-05-01 | Stop reason: HOSPADM

## 2017-04-28 RX ORDER — INSULIN GLARGINE 100 [IU]/ML
40 INJECTION, SOLUTION SUBCUTANEOUS DAILY
Status: DISCONTINUED | OUTPATIENT
Start: 2017-04-29 | End: 2017-05-01 | Stop reason: HOSPADM

## 2017-04-28 RX ORDER — HEPARIN SODIUM 5000 [USP'U]/ML
5000 INJECTION, SOLUTION INTRAVENOUS; SUBCUTANEOUS EVERY 8 HOURS
Status: DISCONTINUED | OUTPATIENT
Start: 2017-04-28 | End: 2017-05-01 | Stop reason: HOSPADM

## 2017-04-28 RX ORDER — ATORVASTATIN CALCIUM 40 MG/1
40 TABLET, FILM COATED ORAL NIGHTLY
Status: DISCONTINUED | OUTPATIENT
Start: 2017-04-28 | End: 2017-05-01 | Stop reason: HOSPADM

## 2017-04-28 RX ORDER — OXYCODONE HYDROCHLORIDE 5 MG/1
5 TABLET ORAL EVERY 4 HOURS PRN
Status: DISCONTINUED | OUTPATIENT
Start: 2017-04-28 | End: 2017-05-01 | Stop reason: HOSPADM

## 2017-04-28 RX ORDER — ACETAMINOPHEN 325 MG/1
650 TABLET ORAL EVERY 6 HOURS PRN
Status: DISCONTINUED | OUTPATIENT
Start: 2017-04-28 | End: 2017-05-01 | Stop reason: HOSPADM

## 2017-04-28 RX ORDER — FUROSEMIDE 10 MG/ML
40 INJECTION INTRAMUSCULAR; INTRAVENOUS ONCE
Status: COMPLETED | OUTPATIENT
Start: 2017-04-28 | End: 2017-04-28

## 2017-04-28 RX ORDER — AMOXICILLIN 250 MG
2 CAPSULE ORAL 2 TIMES DAILY
Status: DISCONTINUED | OUTPATIENT
Start: 2017-04-28 | End: 2017-05-01 | Stop reason: HOSPADM

## 2017-04-28 RX ORDER — CLONIDINE HYDROCHLORIDE 0.1 MG/1
0.1 TABLET ORAL EVERY EVENING
Status: DISCONTINUED | OUTPATIENT
Start: 2017-04-28 | End: 2017-05-01 | Stop reason: HOSPADM

## 2017-04-28 RX ORDER — PROMETHAZINE HYDROCHLORIDE 25 MG/1
12.5-25 TABLET ORAL EVERY 4 HOURS PRN
Status: DISCONTINUED | OUTPATIENT
Start: 2017-04-28 | End: 2017-05-01 | Stop reason: HOSPADM

## 2017-04-28 RX ORDER — LORATADINE 10 MG/1
10 TABLET ORAL
Status: DISCONTINUED | OUTPATIENT
Start: 2017-04-28 | End: 2017-05-01 | Stop reason: HOSPADM

## 2017-04-28 RX ORDER — AMLODIPINE BESYLATE 10 MG/1
10 TABLET ORAL
Status: DISCONTINUED | OUTPATIENT
Start: 2017-04-29 | End: 2017-05-01 | Stop reason: HOSPADM

## 2017-04-28 RX ADMIN — HEPARIN SODIUM 5000 UNITS: 5000 INJECTION, SOLUTION INTRAVENOUS; SUBCUTANEOUS at 17:11

## 2017-04-28 RX ADMIN — OXYCODONE HYDROCHLORIDE 5 MG: 5 TABLET ORAL at 17:06

## 2017-04-28 RX ADMIN — STANDARDIZED SENNA CONCENTRATE AND DOCUSATE SODIUM 2 TABLET: 8.6; 5 TABLET, FILM COATED ORAL at 13:09

## 2017-04-28 RX ADMIN — CARVEDILOL 6.25 MG: 6.25 TABLET, FILM COATED ORAL at 17:06

## 2017-04-28 RX ADMIN — ATORVASTATIN CALCIUM 40 MG: 40 TABLET, FILM COATED ORAL at 20:52

## 2017-04-28 RX ADMIN — STANDARDIZED SENNA CONCENTRATE AND DOCUSATE SODIUM 2 TABLET: 8.6; 5 TABLET, FILM COATED ORAL at 20:51

## 2017-04-28 RX ADMIN — HEPARIN SODIUM 5000 UNITS: 5000 INJECTION, SOLUTION INTRAVENOUS; SUBCUTANEOUS at 20:52

## 2017-04-28 RX ADMIN — ALBUTEROL SULFATE 2.5 MG: 2.5 SOLUTION RESPIRATORY (INHALATION) at 08:56

## 2017-04-28 RX ADMIN — FUROSEMIDE 40 MG: 10 INJECTION, SOLUTION INTRAMUSCULAR; INTRAVENOUS at 08:54

## 2017-04-28 RX ADMIN — FUROSEMIDE 40 MG: 10 INJECTION, SOLUTION INTRAVENOUS at 17:06

## 2017-04-28 RX ADMIN — CLONIDINE HYDROCHLORIDE 0.1 MG: 0.1 TABLET ORAL at 20:51

## 2017-04-28 RX ADMIN — FUROSEMIDE 40 MG: 10 INJECTION, SOLUTION INTRAVENOUS at 13:06

## 2017-04-28 RX ADMIN — CARVEDILOL 6.25 MG: 6.25 TABLET, FILM COATED ORAL at 13:09

## 2017-04-28 ASSESSMENT — PAIN SCALES - WONG BAKER: WONGBAKER_NUMERICALRESPONSE: HURTS AS MUCH AS POSSIBLE

## 2017-04-28 ASSESSMENT — PAIN SCALES - GENERAL
PAINLEVEL_OUTOF10: 0
PAINLEVEL_OUTOF10: 0
PAINLEVEL_OUTOF10: 5
PAINLEVEL_OUTOF10: 0
PAINLEVEL_OUTOF10: 2
PAINLEVEL_OUTOF10: 0

## 2017-04-28 ASSESSMENT — COPD QUESTIONNAIRES
COPD SCREENING SCORE: 2
DURING THE PAST 4 WEEKS HOW MUCH DID YOU FEEL SHORT OF BREATH: NONE/LITTLE OF THE TIME
DO YOU EVER COUGH UP ANY MUCUS OR PHLEGM?: NO/ONLY WITH OCCASIONAL COLDS OR INFECTIONS
HAVE YOU SMOKED AT LEAST 100 CIGARETTES IN YOUR ENTIRE LIFE: NO/DON'T KNOW

## 2017-04-28 ASSESSMENT — LIFESTYLE VARIABLES: EVER_SMOKED: NEVER

## 2017-04-28 NOTE — ED PROVIDER NOTES
ED Provider Note    CHIEF COMPLAINT  Chief Complaint   Patient presents with   • Shortness of Breath       HPI  Veda Ford is a 61 y.o. female who presents with shortness of breath, for the last week, PND, orthopnea, dyspnea on exertion. Less leg swelling. No fever no chills no nausea no vomiting. Does have some chest pressure, mild, dull, nonradiating, no other modifiers. Evidently she was admitted here about 2 weeks ago with similar symptoms. She is not sure what her diagnosis was at that time.  Since discharge from here with the following note:IMPRESSION:  1.  Acute-on-chronic kidney disease.  2.  Hyponatremia.  3.  Anemia of chronic disease.  4.  Status post renal biopsy.  5.  Gastroesophageal reflux disease.  6.  Diabetes mellitus type 2.  7.  Dyslipidemia.  8.  Hypertension.  9.  Morbid obesity.  10.  History of hepatitis C.     FOLLOWUP:  Again, to follow up with primary care physician as well as Dr. Warner, nephrology, within the next 1 to 2 weeks.        DD:  04/02/2017 09:10:25    REVIEW OF SYSTEMS  See HPI for further details. History of hypertension, diabetes, obesity, shortness of breath sleep apnea jaundice hepatitis B cataractsDenies other G.I., G.U.. endrocine, cardiovascular, respriatory or neurological problems.  All other systems are negative.     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   • Hepatitis B      pt states doctor cleared her off disease   • Cold feb 2016   • CATARACT        FAMILY HISTORY  Family History   Problem Relation Age of Onset   • Diabetes Mother    • Heart Disease Father    • Lung Disease Father   "  • Diabetes Brother        SOCIAL HISTORY she has a nonsmoker  Social History     Social History   • Marital Status: Single     Spouse Name: N/A   • Number of Children: N/A   • Years of Education: N/A     Social History Main Topics   • Smoking status: Never Smoker    • Smokeless tobacco: Never Used   • Alcohol Use: Yes      Comment: rarely   • Drug Use: No   • Sexual Activity: Not on file     Other Topics Concern   • Not on file     Social History Narrative       SURGICAL HISTORY  Past Surgical History   Procedure Laterality Date   • Gastroscopy  9/6/2013     Performed by Dwight Back M.D. at [facility]   • Colonoscopy  9/6/2013     Performed by Dwight Back M.D. at SUR[facility]   • Other Bilateral 2015     catacrat surgery   • Mago by laparoscopy  3/16/2016     Procedure: MAGO BY LAPAROSCOPY;  Surgeon: Oskar Cheng M.D.;  Location: SUR[facility];  Service:        CURRENT MEDICATIONS  Home Medications     **Home medications have not yet been reviewed for this encounter**          ALLERGIES  Allergies   Allergen Reactions   • Gabapentin Unspecified     \"Leg aches and pains\"  RXN=1/2016       PHYSICAL EXAM  VITAL SIGNS: /69 mmHg  Pulse 81  Temp(Src) 36.4 °C (97.5 °F) (Temporal)  Resp 20  Ht 1.549 m (5' 1\")  Wt 81.647 kg (180 lb)  BMI 34.03 kg/m2  SpO2 97%  Constitutional: Well developed, Well nourished, No acute distress, Non-toxic appearance.   HENT: Normocephalic, Atraumatic, Bilateral external ears normal, Oropharynx moist, No oral exudates, Nose normal.   Eyes: PERRL, EOMI, Conjunctiva normal, No discharge.   Neck: Normal range of motion, No tenderness, Supple, No stridor.   Lymphatic: No lymphadenopathy noted.   Cardiovascular: Normal heart rate, Normal rhythm, No murmurs, No rubs, No gallops.   Thorax & Lungs: Normal breath sounds, No respiratory distress, No wheezing, No chest tenderness.   Abdomen:  No tenderness, no guarding no rigidity and the abdomen " is soft.  No masses, No pulsatile masses.  Skin: Warm, Dry, No erythema, No rash.   Back: No tenderness, No CVA tenderness.   Extremities: Intact distal pulses, +1 pitting edema, bilateral both lower legs No tenderness, No cyanosis, No clubbing.   Musculoskeletal: Good range of motion in all major joints. No tenderness to palpation or major deformities noted.   Neurologic: Alert & oriented x 3, Normal motor function, Normal sensory function, No focal deficits noted.   Psychiatric: Affect normal, Judgment normal, Mood normal.   EKG Interpretation    Interpreted by me    Rhythm: normal sinus   Rate: normal  Axis: normal  Ectopy: none  Conduction: normal  ST Segments: no acute change  T Waves: no acute change  Q Waves: none    Clinical Impression: I do have an old EKG to compare to and I did not see significant change    RADIOLOGY/PROCEDURES  DX-CHEST-LIMITED (1 VIEW)   Final Result      New congestive failure            COURSE & MEDICAL DECISION MAKING  Pertinent Labs & Imaging studies reviewed. (See chart for details) troponin normal, , clotting studies normal electrolytes normal. BUN and creatinine, significantly elevated, white count normal hematocrit 33 platelet count normal    She comes in today with shortness of breath, ankle edema, similar to what she was admitted for 2 weeks ago. She was given nebulized albuterol,, Lasix. We'll talk with the hospitalist and further disposition. Her BNP is only slightly elevated however chest x-ray demonstrates new congestive heart failure.  FINAL IMPRESSION  1.   1. Shortness of breath    2. Bilateral leg edema        2. Congestive heart failure 3.     Disposition    Electronically signed by: Jose Ureña, 4/28/2017 8:47 AM

## 2017-04-28 NOTE — IP AVS SNAPSHOT
Axiata Access Code: Activation code not generated  Current Axiata Status: Patient Declined    Your email address is not on file at [email or web address].  Email Addresses are required for you to sign up for Axiata, please contact [phone] to verify your personal information and to provide your email address prior to attempting to register for Axiata.    Veda Ford  [address] [place] [address]    collegefeedt  A secure, online tool to manage your health information     [email or web address]’s Axiata® is a secure, online tool that connects you to your personalized health information from the privacy of your home -- day or night - making it very easy for you to manage your healthcare. Once the activation process is completed, you can even access your medical information using the Axiata winifred, which is available for free in the Apple Winifred store or Google Play store.     To learn more about Axiata, visit [email or web address]    There are two levels of access available (as shown below):   My Chart Features  [facility] Primary Care Doctor [facility]  Specialists [facility]  Urgent  Care Non-[facility] Primary Care Doctor   Email your healthcare team securely and privately 24/7 X X X    Manage appointments: schedule your next appointment; view details of past/upcoming appointments X      Request prescription refills. X      View recent personal medical records, including lab and immunizations X X X X   View health record, including health history, allergies, medications X X X X   Read reports about your outpatient visits, procedures, consult and ER notes X X X X   See your discharge summary, which is a recap of your hospital and/or ER visit that includes your diagnosis, lab results, and care plan X X  X     How to register for collegefeedt:  Once your e-mail address has been verified, follow the following steps to sign up for collegefeedt.     1. Go to  [email or web address]  2. Click on the Sign Up Now box, which takes you to the New  Member Sign Up page. You will need to provide the following information:  a. Enter your Perception Software Access Code exactly as it appears at the top of this page. (You will not need to use this code after you’ve completed the sign-up process. If you do not sign up before the expiration date, you must request a new code.)   b. Enter your date of birth.   c. Enter your home email address.   d. Click Submit, and follow the next screen’s instructions.  3. Create a Witelt ID. This will be your Perception Software login ID and cannot be changed, so think of one that is secure and easy to remember.  4. Create a Perception Software password. You can change your password at any time.  5. Enter your Password Reset Question and Answer. This can be used at a later time if you forget your password.   6. Enter your e-mail address. This allows you to receive e-mail notifications when new information is available in Perception Software.  7. Click Sign Up. You can now view your health information.    For assistance activating your Perception Software account, call [phone]

## 2017-04-28 NOTE — H&P
CHIEF COMPLAINT:  Progressive shortness of breath over the past several days.    HISTORY OF PRESENT ILLNESS:  The patient is a very pleasant 61-year-old female   with past medical history of anemia of chronic disease, history of chronic   kidney disease, stage III.  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 Warner.  Essentially, recently was admitted on   03/27/2017 for dyspnea and anasarca.  At that time, patient also had acute   renal injury.  She was provided IV Lasix.  Nephrology was involved.  Her   symptoms improved.  In addition, patient has an echocardiogram, which showed   normal preserved ejection fraction of 65%.  Patient returns to the ER today   complaining of worsening dyspnea over the past several days.  Denies having   any chest pain, denies having any fevers and chills.  Also, notes having new   lower extremity edema and orthopnea.  At this time, patient will be admitted   to telemetry unit for further supportive measures.  Dr. Najjar has been   consulted per ERP for further recommendations.    REVIEW OF SYSTEMS:  Please see HPI.  Otherwise, all other review of systems   are negative per AMA/CMS criteria.    ALLERGIES:  PATIENT IS ALLERGIC TO GABAPENTIN.    PAST MEDICAL HISTORY:  Includes chronic kidney disease stage IV-V with history   of diabetic glomerulosclerosis, history of type 2 diabetes, history of   gastroesophageal reflux disease, history of anemia of chronic disease, history   of hypertension, history of hepatitis C and history of morbid obesity.    SOCIAL HISTORY:  Patient denies using tobacco, alcohol, or illicit drugs.    FAMILY HISTORY:  Reviewed and noncontributory.    MEDICATIONS:  At home include Norvasc 10 mg twice a day, atorvastatin 40 mg   daily, Coreg 6.25 mg b.i.d., Glargine 40 units daily, omeprazole 20 mg daily,   glimepiride 4 mg daily, insulin sliding scale and  loratadine 10 mg daily.    PHYSICAL EXAMINATION:  VITAL SIGNS:  Temperature 97.5, pulse 81, respirations 20, blood pressure   159/69, SpO2 97% on room air.  CONSTITUTIONAL:  Patient appears well-groomed, well-nourished, no apparent   distress, nontoxic appearance.  HEENT:  Normocephalic, atraumatic.  Pupils are equal and reactive to light,   nonicteric.  Mucous membranes are moist.  NECK:  Supple.  No thyromegaly, JVD or stridor.  CARDIOVASCULAR:  Normal rate and rhythm.  No gallops, rubs or murmurs   appreciated.  LUNGS:  Bibasilar rales noted.  No wheezing or rhonchi.  ABDOMEN:  Obese, soft, nontender, nondistended.  Positive bowel sounds.  No   hepatosplenomegaly or pulsatile masses.  SKIN:  Warm and dry.  No erythema or rashes.  EXTREMITIES:  Distal pulses intact, +2.  No cyanosis or clubbing.  There is +1   to 2 pitting edema bilateral lower extremities.  NEUROLOGIC:  Alert and oriented x3.  Cranial nerves II-XII grossly intact.  No   focal deficit.  PSYCHIATRIC:  Affect, judgment and mood is normal.    DIAGNOSTIC DATA:  EKG shows normal sinus rhythm, no acute ST elevations or   depressions noted.  Chest x-ray shows new pulmonary edema.    LABORATORY DATA:  CMP shows BUN 44, creatinine 2.09, alkaline phosphatase 147.    .  Troponin 0.01.  Hemoglobin 10.9, hematocrit 33.2, platelet count   321.    ASSESSMENT AND PLAN:  1.  Acute-on-chronic renal failure, most likely secondary to underlying   chronic kidney disease stage IV with evidence of diabetic glomerulosclerosis.    At this time, Dr. Najjar has been consulted by [facility] for further   recommendations.  2.  Evidence of anasarca.  3.  Anasarca.  At this time, the patient's last echocardiogram was done on   03/28, which showed preserved EF of 40% with essentially LVEF of 60%.  At this   time, I find little value to re-perform another echocardiogram.  I believe   that her symptoms are most likely due to renal insufficiency, although her   albumin appears to  be within normal limits.  At this time, we will start her   on IV Lasix 40 mg daily.  Nephrology has been consulted, appreciate their   recommendations.  We will continue to manage her symptomatically.  4.  Type 2 insulin-dependent diabetes mellitus.  Continue Glargine 40 units   daily with insulin sliding scale with Accu-Cheks and hypoglycemia protocol.  5.  Hypertension.  Continue with Norvasc, carvedilol, and clonidine.  6.  History of gastroesophageal reflux disease.  Continue Nexium.  7.  Anemia of chronic disease, secondary to chronic kidney disease.  No signs   of gross bleeding.  Continue to follow daily CBCs for any acute changes.  At   this time, patient will be placed on heparin 5000 units t.i.d. for deep venous   thrombosis prophylaxis, docusate for GI prophylaxis. The patient's code   status is a full code.  I anticipate hospital length of stay is greater than 2   midnights.       ____________________________________     MD KEENAN CROUCH / JESENIA    DD:  04/28/2017 11:11:02  DT:  04/28/2017 13:29:19    D#:  [id number]  Job#:  [id number]

## 2017-04-28 NOTE — FLOWSHEET NOTE
04/28/17 0857   Interdisciplinary Plan of Care-Goals (Indications)   Obstructive Ventilatory Defect or Pulmonary Disease without Obvious Obstruction Strong Subjective / Objective Improvement   Interdisciplinary Plan of Care-Outcomes    Bronchodilator Outcome Patient at Stable Baseline   Education   Education Yes - Pt. / Family has been Instructed in use of Respiratory Equipment;Yes - Pt. / Family has been Instructed in use of Respiratory Medications and Adverse Reactions   RT Assessment of Delivered Medications   Evaluation of Medication Delivery Daily Yes-- Pt /Family has been Instructed in use of Respiratory Medications and Adverse Reactions   SVN Group   #SVN Performed Yes   Given By: Mouthpiece   Date SVN Last Changed 04/28/17   Date SVN Next Change Due (Q 7 Days) 05/05/17   Respiratory WDL   Respiratory (WDL) X   Chest Exam   Work Of Breathing / Effort Mild   Respiration 18   Pulse 72   Heart Rate (Monitored) 72   Breath Sounds   Pre/Post Intervention Pre Intervention Assessment   RUL Breath Sounds Crackles   RML Breath Sounds Crackles   RLL Breath Sounds Diminished   KHRIS Breath Sounds Crackles   LLL Breath Sounds Diminished   Secretions   Cough Dry   Oximetry   Continuous Oximetry Yes   O2 Alarms Set & Reviewed Yes   Oxygen   Pulse Oximetry 98 %   O2 (LPM) 0   O2 (FiO2) 21   O2 Daily Delivery Respiratory  Room Air with O2 Available

## 2017-04-28 NOTE — IP AVS SNAPSHOT
" <p align=\"LEFT\"><IMG SRC=\"[email or web address]\" alt=\"Image\" WIDTH=\"50%\" HEIGHT=\"200\" BORDER=\"\"></p>                   Name:Veda Ford  Medical Record Number:[id number]  CSN: [id number]    YOB: 1955   Age: 61 y.o.  Sex: female  HT:1.549 m (5' 1\") WT: 77.5 kg (170 lb 13.7 oz)          Admit Date: 4/28/2017     Discharge Date:   Today's Date: 5/1/2017  Attending Doctor:  Nathan Rodriguez M.D.                  Allergies:  Gabapentin          Your appointments     May 03, 2017  3:00 PM   Nm 59 with [facility] CARDIAC PET   [facility]    [address] [facility]  [place] [address]   [phone]            May 17, 2017  2:00 PM   Follow Up Visit with Remedios Warner M.D.   [facility] ([address] [place] [address])    [address] [place] [address] [address]  [place] [address]   [phone]           You will be receiving a confirmation call a few days before your appointment from our automated call confirmation system.            Jul 18, 2017  3:15 PM   FOLLOW UP with Lyly Leary M.D.   [facility] (--)    [address]  [place] [address] [phone]              Follow-up Information     1. Follow up with Jesika Reese N.P..    Specialty:  Family Medicine    Contact information    [address]  [place] [address]  [phone]          2. Follow up with Fadi Najjar, M.D..    Specialty:  Nephrology    Why:  May 17, 2017 to 2:00 pm     Contact information    [address]  [place] [address]  [phone]           Medication List      Take these Medications        Instructions    amlodipine 10 MG Tabs   Commonly known as:  NORVASC    Take 1 Tab by mouth 2 Times a Day.   Dose:  10 mg       atorvastatin 40 MG Tabs   Commonly known as:  LIPITOR    Take 40 mg by mouth every evening.   Dose:  40 mg       carvedilol 6.25 MG Tabs   Commonly known as:  COREG    Take 1 Tab by mouth 2 " times a day, with meals.   Dose:  6.25 mg       clonidine 0.1 MG Tabs   Commonly known as:  CATAPRES    Take 0.1 mg by mouth every evening.   Dose:  0.1 mg       esomeprazole 40 MG delayed-release capsule   Commonly known as:  NEXIUM    Take 40 mg by mouth as needed (For heartburn).   Dose:  40 mg       furosemide 40 MG Tabs   Start taking on:  5/6/2017   Commonly known as:  LASIX    Take 1 Tab by mouth BID 2 DAYS A WEEK.   Dose:  40 mg       glimepiride 4 MG Tabs   Commonly known as:  AMARYL    Take 4 mg by mouth 1 time daily as needed.   Dose:  4 mg       HUMALOG KWIKPEN 100 UNIT/ML Soln   Generic drug:  insulin lispro    Inject 2-6 Units as instructed 3 times a day before meals. 151-200 2 units 201-250 3 units 251-300 5 units 301-350 6 units 351-400 8 units Over 400 9 units and call MD   Dose:  2-6 Units       insulin glargine 100 UNIT/ML Soln   Commonly known as:  LANTUS    Inject 40 Units as instructed See Admin Instructions. Takes every morning but holds the PM dose often due to low sugars   Dose:  40 Units       loratadine 10 MG Tabs   Commonly known as:  CLARITIN    Take 10 mg by mouth 1 time daily as needed.   Dose:  10 mg

## 2017-04-28 NOTE — ED NOTES
The Medication Reconciliation process has been completed by interviewing the patient    Allergies have been reviewed  Antibiotic use in 30 days - NONE    Home Pharmacy:  [facility]

## 2017-04-28 NOTE — ED NOTES
Pt given swab to moisten mouth. Denies any other needs. Reports it is easier to breath at this time. Denies any pain at this time. Will continue to monitor, awaiting disposition.

## 2017-04-28 NOTE — ED NOTES
Pt arrived pov with c/o sob and bilat foot swelling. +2 edema on feet. Pt recently released with kidney dx. Not sleeping well. Pt radha c/o dry cough. In wc./ pt back to lobby.

## 2017-04-28 NOTE — IP AVS SNAPSHOT
5/1/2017    Veda Ford  [address] [place] [address]    Dear Veda:    [facility] wants to ensure your discharge home is safe and you or your loved ones have had all of your questions answered regarding your care after you leave the hospital.    Below is a list of resources and contact information should you have any questions regarding your hospital stay, follow-up instructions, or active medical symptoms.    Questions or Concerns Regarding… Contact   Medical Questions Related to Your Discharge  (7 days a week, 8am-5pm) Contact a Nurse Care Coordinator   [phone]   Medical Questions Not Related to Your Discharge  (24 hours a day / 7 days a week)  Contact the Nurse Health Line   [phone]    Medications or Discharge Instructions Refer to your discharge packet   or contact your [facility] Primary Care Provider   [phone]   Follow-up Appointment(s) Schedule your appointment via [employer]   or contact Scheduling [phone]   Billing Review your statement via [employer]  or contact Billing [phone]   Medical Records Review your records via [employer]   or contact Medical Records [phone]     You may receive a telephone call within two days of discharge. This call is to make certain you understand your discharge instructions and have the opportunity to have any questions answered. You can also easily access your medical information, test results and upcoming appointments via the [employer] free online health management tool. You can learn more and sign up at [email or web address]. For assistance setting up your [employer] account, please call [phone].    Once again, we want to ensure your discharge home is safe and that you have a clear understanding of any next steps in your care. If you have any questions or concerns, please do not hesitate to contact us, we are here for you. Thank you for choosing [facility] for your healthcare needs.    Sincerely,    Your [facility] Healthcare Team

## 2017-04-28 NOTE — IP AVS SNAPSHOT
" Home Care Instructions                                                                                                                  Name:Veda Ford  Medical Record Number:[id number]  CSN: [id number]    YOB: 1955   Age: 61 y.o.  Sex: female  HT:1.549 m (5' 1\") WT: 77.5 kg (170 lb 13.7 oz)          Admit Date: 4/28/2017     Discharge Date:   Today's Date: 5/1/2017  Attending Doctor:  Nathan Rodriguez M.D.                  Allergies:  Gabapentin            Discharge Instructions       Discharge Instructions    Discharged to home by car with relative. Discharged via wheelchair, hospital escort: Yes.  Special equipment needed: Not Applicable    Be sure to schedule a follow-up appointment with your primary care doctor or any specialists as instructed.     Discharge Plan:   Diet Plan: Discussed  Activity Level: Discussed  Confirmed Follow up Appointment: Patient to Call and Schedule Appointment  Confirmed Symptoms Management: Discussed  Medication Reconciliation Updated: Yes  Influenza Vaccine Indication: Patient Refuses    I understand that a diet low in cholesterol, fat, and sodium is recommended for good health. Unless I have been given specific instructions below for another diet, I accept this instruction as my diet prescription.   Other diet: Diabetic, low sodium, 1500 fluid restriction      Special Instructions: None    · Is patient discharged on Warfarin / Coumadin?   No     · Is patient Post Blood Transfusion?  No    Depression / Suicide Risk    As you are discharged from this [facility] facility, it is important to learn how to keep safe from harming yourself.    Recognize the warning signs:  · Abrupt changes in personality, positive or negative- including increase in energy   · Giving away possessions  · Change in eating patterns- significant weight changes-  positive or negative  · Change in sleeping patterns- unable to sleep or sleeping all the time   · Unwillingness or inability to " communicate  · Depression  · Unusual sadness, discouragement and loneliness  · Talk of wanting to die  · Neglect of personal appearance   · Rebelliousness- reckless behavior  · Withdrawal from people/activities they love  · Confusion- inability to concentrate     If you or a loved one observes any of these behaviors or has concerns about self-harm, here's what you can do:  · Talk about it- your feelings and reasons for harming yourself  · Remove any means that you might use to hurt yourself (examples: pills, rope, extension cords, firearm)  · Get professional help from the community (Mental Health, Substance Abuse, psychological counseling)  · Do not be alone:Call your Safe Contact- someone whom you trust who will be there for you.  · Call your local CRISIS HOTLINE 381-7487 or 499-393-2742  · Call your local Children's Mobile Crisis Response Team [place] (156) 678-0852 or www.Herzio  · Call the toll free National Suicide Prevention Hotlines   · National Suicide Prevention Lifeline 299-787-WFFH (3004)  · National Hope Line Network 800-SUICIDE (705-6989)  1.5 Gram Low Sodium Diet  A 1.5 gram sodium diet restricts the amount of sodium in the diet to no more than 1.5 g or 1500 mg daily. The American Heart Association recommends Americans over the age of 20 to consume no more than 1500 mg of sodium each day to reduce the risk of developing high blood pressure. Research also shows that limiting sodium may reduce heart attack and stroke risk. Many foods contain sodium for flavor and sometimes as a preservative. When the amount of sodium in a diet needs to be low, it is important to know what to look for when choosing foods and drinks. The following includes some information and guidelines to help make it easier for you to adapt to a low sodium diet.  QUICK TIPS  · Do not add salt to food.  · Avoid convenience items and fast food.  · Choose unsalted snack foods.  · Buy lower sodium products, often labeled as  "\"lower sodium\" or \"no salt added.\"  · Check food labels to learn how much sodium is in 1 serving.  · When eating at a restaurant, ask that your food be prepared with less salt or none, if possible.  READING FOOD LABELS FOR SODIUM INFORMATION  The nutrition facts label is a good place to find how much sodium is in foods. Look for products with no more than 400 mg of sodium per serving.  Remember that 1.5 g = 1500 mg.  The food label may also list foods as:  · Sodium-free: Less than 5 mg in a serving.  · Very low sodium: 35 mg or less in a serving.  · Low-sodium: 140 mg or less in a serving.  · Light in sodium: 50% less sodium in a serving. For example, if a food that usually has 300 mg of sodium is changed to become light in sodium, it will have 150 mg of sodium.  · Reduced sodium: 25% less sodium in a serving. For example, if a food that usually has 400 mg of sodium is changed to reduced sodium, it will have 300 mg of sodium.  CHOOSING FOODS  Grains  · Avoid: Salted crackers and snack items. Some cereals, including instant hot cereals. Bread stuffing and biscuit mixes. Seasoned rice or pasta mixes.  · Choose: Unsalted snack items. Low-sodium cereals, oats, puffed wheat and rice, shredded wheat. English muffins and bread. Pasta.  Meats  · Avoid: Salted, canned, smoked, spiced, pickled meats, including fish and poultry. [place], ham, sausage, cold cuts, hot dogs, anchovies.  · Choose: Low-sodium canned tuna and salmon. Fresh or frozen meat, poultry, and fish.  Dairy  · Avoid: Processed cheese and spreads. Cottage cheese. Buttermilk and condensed milk. Regular cheese.  · Choose: Milk. Low-sodium cottage cheese. Yogurt. Sour cream. Low-sodium cheese.  Fruits and Vegetables  · Avoid: Regular canned vegetables. Regular canned tomato sauce and paste. Frozen vegetables in sauces. Olives. Pickles. Relishes. Sauerkraut.  · Choose: Low-sodium canned vegetables. Low-sodium tomato sauce and paste. Frozen or fresh vegetables. Fresh " and frozen fruit.  Condiments  · Avoid: Canned and packaged gravies. Worcestershire sauce. Tartar sauce. Barbecue sauce. Soy sauce. Steak sauce. Ketchup. Onion, garlic, and table salt. Meat flavorings and tenderizers.  · Choose: Fresh and dried herbs and spices. Low-sodium varieties of mustard and ketchup. Lemon juice. Tabasco sauce. Horseradish.  SAMPLE 1.5 GRAM SODIUM MEAL PLAN  Breakfast / Sodium (mg)  · 1 cup low-fat milk / 143 mg  · 1 whole-wheat English muffin / 240 mg  · 1 tbs heart-healthy margarine / 153 mg  · 1 hard-boiled egg / 139 mg  · 1 small orange / 0 mg  Lunch / Sodium (mg)  · 1 cup raw carrots / 76 mg  · 2 tbs no salt added peanut butter / 5 mg  · 2 slices whole-wheat bread / 270 mg  · 1 tbs jelly / 6 mg  · ½ cup red grapes / 2 mg  Dinner / Sodium (mg)  · 1 cup whole-wheat pasta / 2 mg  · 1 cup low-sodium tomato sauce / 73 mg  · 3 oz lean ground beef / 57 mg  · 1 small side salad (1 cup raw spinach leaves, ½ cup cucumber, ¼ cup yellow bell pepper) with 1 tsp olive oil and 1 tsp red wine vinegar / 25 mg  Snack / Sodium (mg)  · 1 container low-fat vanilla yogurt / 107 mg  · 3 dalia cracker squares / 127 mg  Nutrient Analysis  · Calories: 1745  · Protein: 75 g  · Carbohydrate: 237 g  · Fat: 57 g  · Sodium: 1425 mg  Document Released: 12/18/2006 Document Revised: 03/11/2013 Document Reviewed: 03/21/2011  ExitCare® Patient Information ©2014 [employer].      Furosemide tablets  What is this medicine?  FUROSEMIDE (fyoor [place] se mide) is a diuretic. It helps you make more urine and to lose salt and excess water from your body. This medicine is used to treat high blood pressure, and edema or swelling from heart, kidney, or liver disease.  This medicine may be used for other purposes; ask your health care provider or pharmacist if you have questions.  COMMON BRAND NAME(S): Twan Dave  What should I tell my health care provider before I take this medicine?  They need to know if you have any of these  conditions:  -abnormal blood electrolytes  -diarrhea or vomiting  -gout  -heart disease  -kidney disease, small amounts of urine, or difficulty passing urine  -liver disease  -an unusual or allergic reaction to furosemide, sulfa drugs, other medicines, foods, dyes, or preservatives  -pregnant or trying to get pregnant  -breast-feeding  How should I use this medicine?  Take this medicine by mouth with a glass of water. Follow the directions on the prescription label. You may take this medicine with or without food. If it upsets your stomach, take it with food or milk. Do not take your medicine more often than directed. Remember that you will need to pass more urine after taking this medicine. Do not take your medicine at a time of day that will cause you problems. Do not take at bedtime.  Talk to your pediatrician regarding the use of this medicine in children. While this drug may be prescribed for selected conditions, precautions do apply.  Overdosage: If you think you have taken too much of this medicine contact a poison control center or emergency room at once.  NOTE: This medicine is only for you. Do not share this medicine with others.  What if I miss a dose?  If you miss a dose, take it as soon as you can. If it is almost time for your next dose, take only that dose. Do not take double or extra doses.  What may interact with this medicine?  -aspirin and aspirin-like medicines  -certain antibiotics  -chloral hydrate  -cisplatin  -cyclosporine  -digoxin  -diuretics  -laxatives  -lithium  -medicines for blood pressure  -medicines that relax muscles for surgery  -methotrexate  -NSAIDs, medicines for pain and inflammation like ibuprofen, naproxen, or indomethacin  -phenytoin  -steroid medicines like prednisone or cortisone  -sucralfate  This list may not describe all possible interactions. Give your health care provider a list of all the medicines, herbs, non-prescription drugs, or dietary supplements you use. Also  tell them if you smoke, drink alcohol, or use illegal drugs. Some items may interact with your medicine.  What should I watch for while using this medicine?  Visit your doctor or health care professional for regular checks on your progress. Check your blood pressure regularly. Ask your doctor or health care professional what your blood pressure should be, and when you should contact him or her. If you are a diabetic, check your blood sugar as directed.  You may need to be on a special diet while taking this medicine. Check with your doctor. Also, ask how many glasses of fluid you need to drink a day. You must not get dehydrated.  You may get drowsy or dizzy. Do not drive, use machinery, or do anything that needs mental alertness until you know how this drug affects you. Do not stand or sit up quickly, especially if you are an older patient. This reduces the risk of dizzy or fainting spells. Alcohol can make you more drowsy and dizzy. Avoid alcoholic drinks.  This medicine can make you more sensitive to the sun. Keep out of the sun. If you cannot avoid being in the sun, wear protective clothing and use sunscreen. Do not use sun lamps or tanning beds/booths.  What side effects may I notice from receiving this medicine?  Side effects that you should report to your doctor or health care professional as soon as possible:  -blood in urine or stools  -dry mouth  -fever or chills  -hearing loss or ringing in the ears  -irregular heartbeat  -muscle pain or weakness, cramps  -skin rash  -stomach upset, pain, or nausea  -tingling or numbness in the hands or feet  -unusually weak or tired  -vomiting or diarrhea  -yellowing of the eyes or skin  Side effects that usually do not require medical attention (report to your doctor or health care professional if they continue or are bothersome):  -headache  -loss of appetite  -unusual bleeding or bruising  This list may not describe all possible side effects. Call your doctor for  medical advice about side effects. You may report side effects to FDA at 2-964-ACT-9735.  Where should I keep my medicine?  Keep out of the reach of children.  Store at room temperature between 15 and 30 degrees C (59 and 86 degrees F). Protect from light. Throw away any unused medicine after the expiration date.  NOTE: This sheet is a summary. It may not cover all possible information. If you have questions about this medicine, talk to your doctor, pharmacist, or health care provider.  © 2014, Elsevier/Gold Standard. (12/6/2010 4:24:50 PM)        Your appointments     May 03, 2017  3:00 PM   Nm [facility] with [facility] [place] CARDIAC PET   [facility]    [address]  [place] [address]   [phone]            May 17, 2017  2:00 PM   Follow Up Visit with Remedios Warner M.D.   [facility] ([address] [place] [address])    [address] [place] [address] [address]  [place] [address]   [phone]           You will be receiving a confirmation call a few days before your appointment from our automated call confirmation system.            Jul 18, 2017  3:15 PM   FOLLOW UP with Lyly Leary M.D.   [facility] (--)    [address]  [place] [address] [phone]              Follow-up Information     1. Follow up with Jesika Reese N.P..    Specialty:  Family Medicine    Contact information    [address]  [place] [address]  [phone]          2. Follow up with Fadi Najjar, M.D..    Specialty:  Nephrology    Why:  May 17, 2017 to 2:00 pm     Contact information    [address]  [address]  [place] [address]  [phone]           Discharge Medication Instructions:    Below are the medications your physician expects you to take upon discharge:    Review all your home medications and newly ordered medications with your doctor and/or pharmacist. Follow medication instructions as directed by your doctor and/or  pharmacist.    Please keep your medication list with you and share with your physician.               Medication List      START taking these medications        Instructions    Morning Afternoon Evening Bedtime    furosemide 40 MG Tabs   Start taking on:  5/6/2017   Commonly known as:  LASIX        Take 1 Tab by mouth BID 2 DAYS A WEEK.   Dose:  40 mg                          CONTINUE taking these medications        Instructions    Morning Afternoon Evening Bedtime    amlodipine 10 MG Tabs   Last time this was given:  10 mg on 5/1/2017  8:21 AM   Commonly known as:  NORVASC        Take 1 Tab by mouth 2 Times a Day.   Dose:  10 mg                        atorvastatin 40 MG Tabs   Last time this was given:  40 mg on 4/30/2017  9:14 PM   Commonly known as:  LIPITOR        Take 40 mg by mouth every evening.   Dose:  40 mg                        carvedilol 6.25 MG Tabs   Last time this was given:  6.25 mg on 5/1/2017  8:21 AM   Commonly known as:  COREG        Take 1 Tab by mouth 2 times a day, with meals.   Dose:  6.25 mg                        clonidine 0.1 MG Tabs   Last time this was given:  0.1 mg on 4/30/2017  9:14 PM   Commonly known as:  CATAPRES        Take 0.1 mg by mouth every evening.   Dose:  0.1 mg                        esomeprazole 40 MG delayed-release capsule   Commonly known as:  NEXIUM        Take 40 mg by mouth as needed (For heartburn).   Dose:  40 mg                        glimepiride 4 MG Tabs   Commonly known as:  AMARYL        Take 4 mg by mouth 1 time daily as needed.   Dose:  4 mg                        HUMALOG KWIKPEN 100 UNIT/ML Soln   Generic drug:  insulin lispro        Inject 2-6 Units as instructed 3 times a day before meals. 151-200 2 units 201-250 3 units 251-300 5 units 301-350 6 units 351-400 8 units Over 400 9 units and call MD   Dose:  2-6 Units                        insulin glargine 100 UNIT/ML Soln   Last time this was given:  40 Units on 5/1/2017  8:23 AM   Commonly known as:   LANTUS        Inject 40 Units as instructed See Admin Instructions. Takes every morning but holds the PM dose often due to low sugars   Dose:  40 Units                        loratadine 10 MG Tabs   Last time this was given:  10 mg on 4/29/2017  8:17 AM   Commonly known as:  CLARITIN        Take 10 mg by mouth 1 time daily as needed.   Dose:  10 mg                             Where to Get Your Medications      Information about where to get these medications is not yet available     ! Ask your nurse or doctor about these medications    - furosemide 40 MG Tabs            Instructions           Diet / Nutrition:    Follow any diet instructions given to you by your doctor or the dietician, including how much salt (sodium) you are allowed each day.    If you are overweight, talk to your doctor about a weight reduction plan.    Activity:    Remain physically active following your doctor's instructions about exercise and activity.    Rest often.     Any time you become even a little tired or short of breath, SIT DOWN and rest.    Worsening Symptoms:    Report any of the following signs and symptoms to the doctor's office immediately:    *Pain of jaw, arm, or neck  *Chest pain not relieved by medication                               *Dizziness or loss of consciousness  *Difficulty breathing even when at rest   *More tired than usual                                       *Bleeding drainage or swelling of surgical site  *Swelling of feet, ankles, legs or stomach                 *Fever (>100ºF)  *Pink or blood tinged sputum  *Weight gain (3lbs/day or 5lbs /week)           *Shock from internal defibrillator (if applicable)  *Palpitations or irregular heartbeats                *Cool and/or numb extremities    Stroke Awareness    Common Risk Factors for Stroke include:    Age  Atrial Fibrillation  Carotid Artery Stenosis  Diabetes Mellitus  Excessive alcohol consumption  High blood pressure  Overweight   Physical  inactivity  Smoking    Warning signs and symptoms of a stroke include:    *Sudden numbness or weakness of the face, arm or leg (especially on one side of the body).  *Sudden confusion, trouble speaking or understanding.  *Sudden trouble seeing in one or both eyes.  *Sudden trouble walking, dizziness, loss of balance or coordination.Sudden severe headache with no known cause.    It is very important to get treatment quickly when a stroke occurs. If you experience any of the above warning signs, call 911 immediately.                   Disclaimer         Quit Smoking / Tobacco Use:    I understand the use of any tobacco products increases my chance of suffering from future heart disease or stroke and could cause other illnesses which may shorten my life. Quitting the use of tobacco products is the single most important thing I can do to improve my health. For further information on smoking / tobacco cessation call a Toll Free Quit Line at 1-380.308.5690 (*National Cancer [place]) or 1-532.499.1230 (American Lung Association) or you can access the web based program at www.lungHubskip.org.    [place] Tobacco Users Help Line:  (267) 494-1726       Toll Free: 1-775.188.7723  Quit Tobacco Program [facility] (480)811-8211    Crisis Hotline:    [place] Crisis Hotline:  5-863-EMPYKCO or 1-890.587.4797    [place] Crisis Hotline:    1-577.159.3223 or 142-281-1525    Discharge Survey:   Thank you for choosing [facility]. We hope we did everything we could to make your hospital stay a pleasant one. You may be receiving a phone survey and we would appreciate your time and participation in answering the questions. Your input is very valuable to us in our efforts to improve our service to our patients and their families.        My signature on this form indicates that:    1. I have reviewed and understand the above information.  2. My questions regarding this information have been answered to my  satisfaction.  3. I have formulated a plan with my discharge nurse to obtain my prescribed medications for home.                  Disclaimer         __________________________________                     __________       ________                       Patient Signature                                                 Date                    Time

## 2017-04-29 ENCOUNTER — APPOINTMENT (OUTPATIENT)
Dept: RADIOLOGY | Facility: MEDICAL CENTER | Age: 62
DRG: 189 | End: 2017-04-29
Attending: INTERNAL MEDICINE
Payer: MEDICAID

## 2017-04-29 PROBLEM — E11.21 TYPE 2 DIABETES MELLITUS WITH DIABETIC NEPHROPATHY (HCC): Status: ACTIVE | Noted: 2017-04-28

## 2017-04-29 PROBLEM — D63.8 ANEMIA OF CHRONIC DISEASE: Status: ACTIVE | Noted: 2017-04-29

## 2017-04-29 PROBLEM — E66.9 OBESITY: Status: ACTIVE | Noted: 2017-04-29

## 2017-04-29 PROBLEM — J96.00 ACUTE RESPIRATORY FAILURE (HCC): Status: ACTIVE | Noted: 2017-04-28

## 2017-04-29 LAB
ALBUMIN SERPL BCP-MCNC: 2.9 G/DL (ref 3.2–4.9)
ALBUMIN/GLOB SERPL: 1.1 G/DL
ALP SERPL-CCNC: 115 U/L (ref 30–99)
ALT SERPL-CCNC: 10 U/L (ref 2–50)
ANION GAP SERPL CALC-SCNC: 9 MMOL/L (ref 0–11.9)
AST SERPL-CCNC: 13 U/L (ref 12–45)
BASOPHILS # BLD AUTO: 0.5 % (ref 0–1.8)
BASOPHILS # BLD: 0.04 K/UL (ref 0–0.12)
BILIRUB SERPL-MCNC: 0.2 MG/DL (ref 0.1–1.5)
BUN SERPL-MCNC: 42 MG/DL (ref 8–22)
CALCIUM SERPL-MCNC: 8 MG/DL (ref 8.5–10.5)
CHLORIDE SERPL-SCNC: 107 MMOL/L (ref 96–112)
CO2 SERPL-SCNC: 22 MMOL/L (ref 20–33)
CREAT SERPL-MCNC: 3.33 MG/DL (ref 0.5–1.4)
EOSINOPHIL # BLD AUTO: 0.35 K/UL (ref 0–0.51)
EOSINOPHIL NFR BLD: 4.4 % (ref 0–6.9)
ERYTHROCYTE [DISTWIDTH] IN BLOOD BY AUTOMATED COUNT: 39.7 FL (ref 35.9–50)
GFR SERPL CREATININE-BSD FRML MDRD: 14 ML/MIN/1.73 M 2
GLOBULIN SER CALC-MCNC: 2.7 G/DL (ref 1.9–3.5)
GLUCOSE BLD-MCNC: 103 MG/DL (ref 65–99)
GLUCOSE BLD-MCNC: 108 MG/DL (ref 65–99)
GLUCOSE BLD-MCNC: 126 MG/DL (ref 65–99)
GLUCOSE BLD-MCNC: 150 MG/DL (ref 65–99)
GLUCOSE SERPL-MCNC: 121 MG/DL (ref 65–99)
HCT VFR BLD AUTO: 30.4 % (ref 37–47)
HGB BLD-MCNC: 9.7 G/DL (ref 12–16)
IMM GRANULOCYTES # BLD AUTO: 0.03 K/UL (ref 0–0.11)
IMM GRANULOCYTES NFR BLD AUTO: 0.4 % (ref 0–0.9)
LYMPHOCYTES # BLD AUTO: 2.38 K/UL (ref 1–4.8)
LYMPHOCYTES NFR BLD: 30 % (ref 22–41)
MCH RBC QN AUTO: 27.7 PG (ref 27–33)
MCHC RBC AUTO-ENTMCNC: 31.9 G/DL (ref 33.6–35)
MCV RBC AUTO: 86.9 FL (ref 81.4–97.8)
MONOCYTES # BLD AUTO: 0.81 K/UL (ref 0–0.85)
MONOCYTES NFR BLD AUTO: 10.2 % (ref 0–13.4)
NEUTROPHILS # BLD AUTO: 4.32 K/UL (ref 2–7.15)
NEUTROPHILS NFR BLD: 54.5 % (ref 44–72)
NRBC # BLD AUTO: 0 K/UL
NRBC BLD AUTO-RTO: 0 /100 WBC
PLATELET # BLD AUTO: 276 K/UL (ref 164–446)
PMV BLD AUTO: 9.9 FL (ref 9–12.9)
POTASSIUM SERPL-SCNC: 4.5 MMOL/L (ref 3.6–5.5)
PROT SERPL-MCNC: 5.6 G/DL (ref 6–8.2)
RBC # BLD AUTO: 3.5 M/UL (ref 4.2–5.4)
SODIUM SERPL-SCNC: 138 MMOL/L (ref 135–145)
WBC # BLD AUTO: 7.9 K/UL (ref 4.8–10.8)

## 2017-04-29 PROCEDURE — 85025 COMPLETE CBC W/AUTO DIFF WBC: CPT

## 2017-04-29 PROCEDURE — 99233 SBSQ HOSP IP/OBS HIGH 50: CPT | Performed by: INTERNAL MEDICINE

## 2017-04-29 PROCEDURE — 770006 HCHG ROOM/CARE - MED/SURG/GYN SEMI*

## 2017-04-29 PROCEDURE — 36415 COLL VENOUS BLD VENIPUNCTURE: CPT

## 2017-04-29 PROCEDURE — 82962 GLUCOSE BLOOD TEST: CPT

## 2017-04-29 PROCEDURE — 700102 HCHG RX REV CODE 250 W/ 637 OVERRIDE(OP): Performed by: INTERNAL MEDICINE

## 2017-04-29 PROCEDURE — 71020 DX-CHEST-2 VIEWS: CPT

## 2017-04-29 PROCEDURE — A9270 NON-COVERED ITEM OR SERVICE: HCPCS | Performed by: INTERNAL MEDICINE

## 2017-04-29 PROCEDURE — 700111 HCHG RX REV CODE 636 W/ 250 OVERRIDE (IP): Performed by: HOSPITALIST

## 2017-04-29 PROCEDURE — A9270 NON-COVERED ITEM OR SERVICE: HCPCS | Performed by: HOSPITALIST

## 2017-04-29 PROCEDURE — 700102 HCHG RX REV CODE 250 W/ 637 OVERRIDE(OP): Performed by: HOSPITALIST

## 2017-04-29 PROCEDURE — 80053 COMPREHEN METABOLIC PANEL: CPT

## 2017-04-29 RX ADMIN — CARVEDILOL 6.25 MG: 6.25 TABLET, FILM COATED ORAL at 08:17

## 2017-04-29 RX ADMIN — HEPARIN SODIUM 5000 UNITS: 5000 INJECTION, SOLUTION INTRAVENOUS; SUBCUTANEOUS at 05:40

## 2017-04-29 RX ADMIN — STANDARDIZED SENNA CONCENTRATE AND DOCUSATE SODIUM 2 TABLET: 8.6; 5 TABLET, FILM COATED ORAL at 08:17

## 2017-04-29 RX ADMIN — OXYCODONE HYDROCHLORIDE 5 MG: 5 TABLET ORAL at 05:40

## 2017-04-29 RX ADMIN — STANDARDIZED SENNA CONCENTRATE AND DOCUSATE SODIUM 2 TABLET: 8.6; 5 TABLET, FILM COATED ORAL at 20:45

## 2017-04-29 RX ADMIN — OXYCODONE HYDROCHLORIDE 5 MG: 5 TABLET ORAL at 20:45

## 2017-04-29 RX ADMIN — CARVEDILOL 6.25 MG: 6.25 TABLET, FILM COATED ORAL at 16:57

## 2017-04-29 RX ADMIN — AMLODIPINE BESYLATE 10 MG: 10 TABLET ORAL at 08:17

## 2017-04-29 RX ADMIN — FUROSEMIDE 40 MG: 10 INJECTION, SOLUTION INTRAVENOUS at 05:40

## 2017-04-29 RX ADMIN — HEPARIN SODIUM 5000 UNITS: 5000 INJECTION, SOLUTION INTRAVENOUS; SUBCUTANEOUS at 20:44

## 2017-04-29 RX ADMIN — INSULIN GLARGINE 40 UNITS: 100 INJECTION, SOLUTION SUBCUTANEOUS at 08:15

## 2017-04-29 RX ADMIN — OXYCODONE HYDROCHLORIDE 5 MG: 5 TABLET ORAL at 13:15

## 2017-04-29 RX ADMIN — ATORVASTATIN CALCIUM 40 MG: 40 TABLET, FILM COATED ORAL at 20:45

## 2017-04-29 RX ADMIN — CLONIDINE HYDROCHLORIDE 0.1 MG: 0.1 TABLET ORAL at 20:45

## 2017-04-29 RX ADMIN — LORATADINE 10 MG: 10 TABLET ORAL at 08:17

## 2017-04-29 RX ADMIN — HEPARIN SODIUM 5000 UNITS: 5000 INJECTION, SOLUTION INTRAVENOUS; SUBCUTANEOUS at 14:44

## 2017-04-29 RX ADMIN — FUROSEMIDE 40 MG: 10 INJECTION, SOLUTION INTRAVENOUS at 16:57

## 2017-04-29 ASSESSMENT — PAIN SCALES - GENERAL
PAINLEVEL_OUTOF10: 0
PAINLEVEL_OUTOF10: 0
PAINLEVEL_OUTOF10: 5
PAINLEVEL_OUTOF10: 0
PAINLEVEL_OUTOF10: 5

## 2017-04-29 NOTE — CARE PLAN
Problem: Communication  Goal: The ability to communicate needs accurately and effectively will improve  Outcome: PROGRESSING AS EXPECTED  Plan of care discussed. Expectations and limitations set during initial encounter.  Allowed pt to ask  Questions/ clarifications during discussion of POC.     Problem: Safety  Goal: Will remain free from injury  Outcome: PROGRESSING AS EXPECTED  Hourly rounds done. Call light within reach. Needs attended on a timely manner. Treaded socks on when OOB. Upself with steady gait. Calls appropriately. Refused bed alarm     Problem: Venous Thromboembolism (VTW)/Deep Vein Thrombosis (DVT) Prevention:  Goal: Patient will participate in Venous Thrombosis (VTE)/Deep Vein Thrombosis (DVT)Prevention Measures  Outcome: PROGRESSING AS EXPECTED  Ambulates frequently. Receives heparin 5000 SC for DVT/VTE prophylaxis.     Problem: Pain Management  Goal: Pain level will decrease to patient’s comfort goal  Outcome: PROGRESSING AS EXPECTED  Pain assessment done routinely. Educated patient with non-pharmacologic techniques in pain management. Receives Oxy IR for pain relief.

## 2017-04-29 NOTE — CONSULTS
DATE OF SERVICE:  04/29/2017    DATE OF CONSULTATION:  04/29/2017    REQUESTING PHYSICIAN:  Nathan Rodriguez MD    REASON FOR CONSULTATION:  Acute kidney injury on chronic kidney disease,   volume overload, assessing the need for urgent dialysis.    The patient seen and examined, medical records were reviewed.    HISTORY OF PRESENT ILLNESS:  The patient is an unfortunate 61-year-old lady   with a past medical history significant for longstanding diabetes mellitus,   more than 10 years, history of chronic kidney disease stage III to IV,   presented to the hospital yesterday with progressive shortness of breath as   well as worsening lower extremity edema.  In the hospitalization, the patient   was found to be in acute kidney injury with a creatinine up to 3.33 this   morning.  Her baseline back in early March was 2.7.    The patient was treated with Lasix and she feels little bit better.    The patient has no hematuria, no dysuria, no recent use of NSAIDs or IV   contrast.    PAST MEDICAL HISTORY:  Significant for:  1.  Diabetes mellitus.  2.  Chronic kidney disease, most likely diabetic nephropathy.  3.  Anemia.  4.  Hypertension.    SOCIAL HISTORY:  No smoking or alcohol use.    FAMILY HISTORY:  No known renal disease.    ALLERGIES:  The list was reviewed.    MEDICATIONS:  Reviewed.    REVIEW OF SYSTEMS:  The patient has mild shortness breath and dyspnea with   exertion, but it has improved.  Also, she has lower extremity edema.  All   other review of systems for total of 10 were negative except outlined in the   history of present illness.    PHYSICAL EXAMINATION:  GENERAL:  The patient is in no apparent distress.  VITAL SIGNS:  Show blood pressure of 130/72, heart rate was 64.  HEENT:  Normocephalic, atraumatic.  Sclerae anicteric.  NECK:  No lymphadenopathy.  CHEST:  Normal.  LUNGS:  Few rales at the bases.  HEART:  S1, S2.  ABDOMEN:  Soft, nontender.  EXTREMITIES:  There is +1 edema.  SKIN:  No skin  rash.  NEUROLOGIC:  The patient is alert and oriented x3.    LABORATORY DATA:  Her recent labs from today were reviewed.  Also, the patient   had a chest x-ray done earlier today.  I reviewed the image, showed pleural   effusion.    ASSESSMENT AND PLAN:  1.  Acute kidney injury on chronic kidney disease.  I believe that this is her   progression of diabetic nephropathy and the patient right now is stage V of   chronic kidney disease.  2.  Volume overload.  3.  Hypertension.  4.  Anemia.  5.  Hypoalbuminemia.    PLAN:  1.  There is no acute need for renal replacement therapy.  2.  Continue Lasix.  3.  Renal dose all medications.  4.  Avoid nephrotoxins.  5.  Check urine sodium, creatinine as well as protein.  6.  Check iron panel.  7.  Daily labs.  8.  If there is no improvement in her kidney function in the next few days, we   might consider starting dialysis.  9.  Prognosis is guarded.    Plan discussed in detail with Dr. Rodriguez who I would like to thank for   consulting this very interesting case.       ____________________________________     FADI NAJJAR, MD FN / JESENIA    DD:  04/29/2017 13:42:37  DT:  04/29/2017 15:57:39    D#:  [id number]  Job#:  [id number]

## 2017-04-29 NOTE — CARE PLAN
Problem: Knowledge Deficit  Goal: Knowledge of the prescribed therapeutic regimen will improve  Intervention: Discuss information regarding therpeutic regimen and document in education  Went over POC today strict I and O

## 2017-04-29 NOTE — PROGRESS NOTES
Hospital Medicine Progress Note, Adult, Complex               Author: Nathanroro Rodriguez Date & Time created: 4/29/2017  7:49 AM     Interval History:  61/F with CKD stage 3 due to diabetic glumerulosclerosis, with recent worsening during last admission (2.75-3.5), Type 2 DM, GERD, AOCD, HTN, chronic hepatitis C, morbid obesity,, admitted for worsening SOB, orthopnea, and leg swelling. Last EF normal. Crea 3.09. CXR showed pulmonary edema. Started on lasix. Nephrology consulted.     4/29/2017 - no overnight events. Remains hemodynamically stable and afebrile. Stable on RA, saturating well. No leukocytosis. Hgb 9.7. Crea 3.33. BG reasonable. Urinating well (6 episodes yesterday). Awaiting nephrology inputs.       > Seen and examined. Breathing better, but still unable to lie flat. B/L leg edema improved. No CP, abd pain, diarrhea. Slight nausea but no vomiting. Denied eating more salt or drinking more fluids.       Review of Systems:  ROS    Pertinent positives/negatives as mentioned above.     A complete review of systems was done. All other systems were negative.       Physical Exam:  Physical Exam   Constitutional: She is oriented to person, place, and time. She appears well-developed and well-nourished. No distress.   HENT:   Head: Normocephalic and atraumatic.   Mouth/Throat: No oropharyngeal exudate.   Eyes: Conjunctivae are normal. Pupils are equal, round, and reactive to light. No scleral icterus.   Neck: Normal range of motion. Neck supple.   Cardiovascular: Normal rate and regular rhythm.  Exam reveals no gallop and no friction rub.    No murmur heard.  Pulmonary/Chest: Effort normal and breath sounds normal. No respiratory distress. She has no wheezes. She has no rales. She exhibits no tenderness.   Diminished air entry B/L bases   Abdominal: Soft. Bowel sounds are normal. She exhibits no distension. There is no tenderness. There is no rebound and no guarding.   Musculoskeletal: Normal range of motion. She  exhibits edema (1+ B/L LE). She exhibits no tenderness.   Lymphadenopathy:     She has no cervical adenopathy.   Neurological: She is alert and oriented to person, place, and time. No cranial nerve deficit.   Skin: Skin is warm and dry. No rash noted. No erythema. No pallor.   Psychiatric: She has a normal mood and affect. Her behavior is normal. Judgment and thought content normal.   Nursing note and vitals reviewed.      Labs:        Invalid input(s): [id number]  Recent Labs      17   TROPONINI  <0.01   BNPBTYPENAT  153*     Recent Labs      17   0341   SODIUM  141  138   POTASSIUM  4.7  4.5   CHLORIDE  110  107   CO2  20  22   BUN  44*  42*   CREATININE  3.09*  3.33*   MAGNESIUM  2.0   --    CALCIUM  8.6  8.0*     Recent Labs      17   0341   ALTSGPT  14  10   ASTSGOT  15  13   ALKPHOSPHAT  147*  115*   TBILIRUBIN  0.4  0.2   LIPASE  11   --    GLUCOSE  98  121*     Recent Labs      17   034   RBC  3.86*  3.50*   HEMOGLOBIN  10.9*  9.7*   HEMATOCRIT  33.2*  30.4*   PLATELETCT  321  276   PROTHROMBTM  12.4   --    APTT  28.7   --    INR  0.90   --      Recent Labs      17   0341   WBC  9.3  7.9   NEUTSPOLYS  71.40  54.50   LYMPHOCYTES  16.50*  30.00   MONOCYTES  8.40  10.20   EOSINOPHILS  2.70  4.40   BASOPHILS  0.60  0.50   ASTSGOT  15  13   ALTSGPT  14  10   ALKPHOSPHAT  147*  115*   TBILIRUBIN  0.4  0.2           Hemodynamics:  Temp (24hrs), Av.7 °C (98.1 °F), Min:36.4 °C (97.5 °F), Max:37 °C (98.6 °F)  Temperature: 36.6 °C (97.9 °F)  Pulse  Av.1  Min: 65  Max: 99Heart Rate (Monitored): 77  Blood Pressure: 125/63 mmHg     Respiratory:    Respiration: 18, Pulse Oximetry: 94 %, O2 Daily Delivery Respiratory : Room Air with O2 Available     Given By:: Mouthpiece, Work Of Breathing / Effort: Mild  RUL Breath Sounds: Clear, RML Breath Sounds: Clear, RLL Breath Sounds: Diminished, KHRIS Breath Sounds:  Clear, LLL Breath Sounds: Diminished  Fluids:    Intake/Output Summary (Last 24 hours) at 04/29/17 0749  Last data filed at 04/28/17 2000   Gross per 24 hour   Intake    250 ml   Output      0 ml   Net    250 ml     Weight: 81.647 kg (180 lb)  GI/Nutrition:  Orders Placed This Encounter   Procedures   • Diet Order     Standing Status: Standing      Number of Occurrences: 1      Standing Expiration Date:      Order Specific Question:  Diet:     Answer:  Diabetic [3]     Medical Decision Making, by Problem:  Active Hospital Problems    Diagnosis   • Anasarca [R60.1]   • Pulmonary edema [J81.1]   • Type 2 diabetes mellitus (CMS-HCC) [E11.9]   • History of hepatitis C [Z86.19]   • Acute on chronic renal failure (CMS-HCC) [N17.9, N18.9]     Acute respiratory failure due to pulmonary edema, anasarca, POA  - continue IV lasix 40mg q12H. Strict I&O, and daily weights. Repeat CXR to reevaluate for improvement in pulmonary edema.  - had recent echo, no need to repeat another one. EF normal.   - continue RT protocol, PRN O2.     Acute-on-chronic renal failure (CKD 3), glumerulosclerosis  - had recent worsening of renal function but has been stable since recent admission.  - I called Dr. Najjar (nephrology), who will consult, await inputs.  - Continue lasix as above. Monitor renal function.  - avoid nephrotoxins. Renal dose adjust meds.     Type 2 diabetes mellitus with nephropathy  - hold amaryl for now. Continue lantus and SSI. Diabetic diet. Accucheck AC and HS.     HTN  - continue norvasc, coreg, clonidine.     Anemia of chronic disease  - Monitor Hgb daily.     GERD  - continue PPI.    Obesity  - Body mass index is 34.03 kg/(m^2).    Dispo - monitor on the floors.       Labs reviewed, Medications reviewed and Radiology images reviewed  Jhaveri catheter: No Jhaveri      DVT Prophylaxis: Heparin    Ulcer prophylaxis: Yes    Assessed for rehab: Patient returned to prior level of function, rehabilitation not indicated at this  time

## 2017-04-30 LAB
ANION GAP SERPL CALC-SCNC: 10 MMOL/L (ref 0–11.9)
BASOPHILS # BLD AUTO: 0.6 % (ref 0–1.8)
BASOPHILS # BLD: 0.05 K/UL (ref 0–0.12)
BUN SERPL-MCNC: 43 MG/DL (ref 8–22)
CALCIUM SERPL-MCNC: 8 MG/DL (ref 8.5–10.5)
CHLORIDE SERPL-SCNC: 106 MMOL/L (ref 96–112)
CO2 SERPL-SCNC: 22 MMOL/L (ref 20–33)
CREAT SERPL-MCNC: 3.32 MG/DL (ref 0.5–1.4)
EOSINOPHIL # BLD AUTO: 0.3 K/UL (ref 0–0.51)
EOSINOPHIL NFR BLD: 3.5 % (ref 0–6.9)
ERYTHROCYTE [DISTWIDTH] IN BLOOD BY AUTOMATED COUNT: 37.3 FL (ref 35.9–50)
GFR SERPL CREATININE-BSD FRML MDRD: 14 ML/MIN/1.73 M 2
GLUCOSE BLD-MCNC: 109 MG/DL (ref 65–99)
GLUCOSE BLD-MCNC: 126 MG/DL (ref 65–99)
GLUCOSE BLD-MCNC: 140 MG/DL (ref 65–99)
GLUCOSE BLD-MCNC: 80 MG/DL (ref 65–99)
GLUCOSE BLD-MCNC: 95 MG/DL (ref 65–99)
GLUCOSE SERPL-MCNC: 100 MG/DL (ref 65–99)
HCT VFR BLD AUTO: 29.5 % (ref 37–47)
HGB BLD-MCNC: 9.8 G/DL (ref 12–16)
IMM GRANULOCYTES # BLD AUTO: 0.03 K/UL (ref 0–0.11)
IMM GRANULOCYTES NFR BLD AUTO: 0.4 % (ref 0–0.9)
LYMPHOCYTES # BLD AUTO: 2.57 K/UL (ref 1–4.8)
LYMPHOCYTES NFR BLD: 30.1 % (ref 22–41)
MCH RBC QN AUTO: 28.4 PG (ref 27–33)
MCHC RBC AUTO-ENTMCNC: 33.2 G/DL (ref 33.6–35)
MCV RBC AUTO: 85.5 FL (ref 81.4–97.8)
MONOCYTES # BLD AUTO: 0.9 K/UL (ref 0–0.85)
MONOCYTES NFR BLD AUTO: 10.6 % (ref 0–13.4)
NEUTROPHILS # BLD AUTO: 4.68 K/UL (ref 2–7.15)
NEUTROPHILS NFR BLD: 54.8 % (ref 44–72)
NRBC # BLD AUTO: 0 K/UL
NRBC BLD AUTO-RTO: 0 /100 WBC
PLATELET # BLD AUTO: 262 K/UL (ref 164–446)
PMV BLD AUTO: 9.9 FL (ref 9–12.9)
POTASSIUM SERPL-SCNC: 4.1 MMOL/L (ref 3.6–5.5)
RBC # BLD AUTO: 3.45 M/UL (ref 4.2–5.4)
SODIUM SERPL-SCNC: 138 MMOL/L (ref 135–145)
WBC # BLD AUTO: 8.5 K/UL (ref 4.8–10.8)

## 2017-04-30 PROCEDURE — 85025 COMPLETE CBC W/AUTO DIFF WBC: CPT

## 2017-04-30 PROCEDURE — 80048 BASIC METABOLIC PNL TOTAL CA: CPT

## 2017-04-30 PROCEDURE — 700102 HCHG RX REV CODE 250 W/ 637 OVERRIDE(OP): Performed by: HOSPITALIST

## 2017-04-30 PROCEDURE — A9270 NON-COVERED ITEM OR SERVICE: HCPCS | Performed by: HOSPITALIST

## 2017-04-30 PROCEDURE — 36415 COLL VENOUS BLD VENIPUNCTURE: CPT

## 2017-04-30 PROCEDURE — 99233 SBSQ HOSP IP/OBS HIGH 50: CPT | Performed by: INTERNAL MEDICINE

## 2017-04-30 PROCEDURE — 700102 HCHG RX REV CODE 250 W/ 637 OVERRIDE(OP): Performed by: INTERNAL MEDICINE

## 2017-04-30 PROCEDURE — 700111 HCHG RX REV CODE 636 W/ 250 OVERRIDE (IP): Performed by: HOSPITALIST

## 2017-04-30 PROCEDURE — A9270 NON-COVERED ITEM OR SERVICE: HCPCS | Performed by: INTERNAL MEDICINE

## 2017-04-30 PROCEDURE — 82962 GLUCOSE BLOOD TEST: CPT | Mod: 91

## 2017-04-30 PROCEDURE — 770006 HCHG ROOM/CARE - MED/SURG/GYN SEMI*

## 2017-04-30 RX ADMIN — CARVEDILOL 6.25 MG: 6.25 TABLET, FILM COATED ORAL at 08:59

## 2017-04-30 RX ADMIN — FUROSEMIDE 40 MG: 10 INJECTION, SOLUTION INTRAVENOUS at 06:11

## 2017-04-30 RX ADMIN — INSULIN GLARGINE 40 UNITS: 100 INJECTION, SOLUTION SUBCUTANEOUS at 09:01

## 2017-04-30 RX ADMIN — HEPARIN SODIUM 5000 UNITS: 5000 INJECTION, SOLUTION INTRAVENOUS; SUBCUTANEOUS at 13:37

## 2017-04-30 RX ADMIN — OXYCODONE HYDROCHLORIDE 5 MG: 5 TABLET ORAL at 21:14

## 2017-04-30 RX ADMIN — CLONIDINE HYDROCHLORIDE 0.1 MG: 0.1 TABLET ORAL at 21:14

## 2017-04-30 RX ADMIN — OXYCODONE HYDROCHLORIDE 5 MG: 5 TABLET ORAL at 16:00

## 2017-04-30 RX ADMIN — STANDARDIZED SENNA CONCENTRATE AND DOCUSATE SODIUM 2 TABLET: 8.6; 5 TABLET, FILM COATED ORAL at 21:14

## 2017-04-30 RX ADMIN — FUROSEMIDE 40 MG: 10 INJECTION, SOLUTION INTRAVENOUS at 15:54

## 2017-04-30 RX ADMIN — ATORVASTATIN CALCIUM 40 MG: 40 TABLET, FILM COATED ORAL at 21:14

## 2017-04-30 RX ADMIN — HEPARIN SODIUM 5000 UNITS: 5000 INJECTION, SOLUTION INTRAVENOUS; SUBCUTANEOUS at 06:12

## 2017-04-30 RX ADMIN — OXYCODONE HYDROCHLORIDE 5 MG: 5 TABLET ORAL at 06:12

## 2017-04-30 RX ADMIN — CARVEDILOL 6.25 MG: 6.25 TABLET, FILM COATED ORAL at 15:54

## 2017-04-30 RX ADMIN — AMLODIPINE BESYLATE 10 MG: 10 TABLET ORAL at 08:59

## 2017-04-30 RX ADMIN — HEPARIN SODIUM 5000 UNITS: 5000 INJECTION, SOLUTION INTRAVENOUS; SUBCUTANEOUS at 21:13

## 2017-04-30 ASSESSMENT — ENCOUNTER SYMPTOMS
FEVER: 0
VOMITING: 0
SHORTNESS OF BREATH: 0
CHILLS: 0
NAUSEA: 0

## 2017-04-30 ASSESSMENT — PAIN SCALES - GENERAL
PAINLEVEL_OUTOF10: 0
PAINLEVEL_OUTOF10: 5
PAINLEVEL_OUTOF10: 0
PAINLEVEL_OUTOF10: 5
PAINLEVEL_OUTOF10: 5

## 2017-04-30 NOTE — CARE PLAN
Problem: Communication  Goal: The ability to communicate needs accurately and effectively will improve  Outcome: PROGRESSING AS EXPECTED  Plan of care discussed. Expectations and limitations set during initial encounter.  Allowed pt to ask  Questions/ clarifications during discussion of POC.     Problem: Safety  Goal: Will remain free from injury  Outcome: PROGRESSING AS EXPECTED  Hourly rounds done. Call light within reach. Needs attended on a timely manner. Treaded socks on when OOB.  Up self with steady gait. Calls appropriately. Refused bed alarm.    Problem: Venous Thromboembolism (VTW)/Deep Vein Thrombosis (DVT) Prevention:  Goal: Patient will participate in Venous Thrombosis (VTE)/Deep Vein Thrombosis (DVT)Prevention Measures  Outcome: PROGRESSING AS EXPECTED  Ambulates frequently. Receives Heparin SC for DVT/VTE prophylaxis.     Problem: Pain Management  Goal: Pain level will decrease to patient’s comfort goal  Outcome: PROGRESSING AS EXPECTED  Pain assessment done routinely. Educated patient with non-pharmacologic techniques in pain management. Receives Oxy IR for pain relief.     Problem: Fluid Volume:  Goal: Will maintain balanced intake and output  Outcome: PROGRESSING AS EXPECTED  Pt on Strict I&Os.

## 2017-04-30 NOTE — PROGRESS NOTES
Hospital Medicine Progress Note, Adult, Complex               Author: Fadi Najjar Date & Time created: 4/30/2017  1:29 PM     Interval History:  Pt with DM, CKD, presented with volume overload, SOB.  Doing better with Lasix    Review of Systems:  Review of Systems   Constitutional: Negative for fever and chills.   Respiratory: Negative for shortness of breath.    Cardiovascular: Negative for chest pain and leg swelling.   Gastrointestinal: Negative for nausea and vomiting.       Physical Exam:  Physical Exam   Constitutional: She appears well-developed and well-nourished. No distress.   HENT:   Head: Normocephalic and atraumatic.   Nose: Nose normal.   Cardiovascular: Normal rate.    Pulmonary/Chest: Effort normal. She has no wheezes.   Musculoskeletal: She exhibits edema.   Skin: She is not diaphoretic.   Nursing note and vitals reviewed.      Labs:        Invalid input(s): [id number]  Recent Labs      04/28/17 0830   TROPONINI  <0.01   BNPBTYPENAT  153*     Recent Labs      04/28/17 0830 04/29/17 0341 04/30/17 0221   SODIUM  141  138  138   POTASSIUM  4.7  4.5  4.1   CHLORIDE  110  107  106   CO2  20  22  22   BUN  44*  42*  43*   CREATININE  3.09*  3.33*  3.32*   MAGNESIUM  2.0   --    --    CALCIUM  8.6  8.0*  8.0*     Recent Labs      04/28/17 0830 04/29/17 0341 04/30/17 0221   ALTSGPT  14  10   --    ASTSGOT  15  13   --    ALKPHOSPHAT  147*  115*   --    TBILIRUBIN  0.4  0.2   --    LIPASE  11   --    --    GLUCOSE  98  121*  100*     Recent Labs      04/28/17 0830 04/29/17 0341 04/30/17 0221   RBC  3.86*  3.50*  3.45*   HEMOGLOBIN  10.9*  9.7*  9.8*   HEMATOCRIT  33.2*  30.4*  29.5*   PLATELETCT  321  276  262   PROTHROMBTM  12.4   --    --    APTT  28.7   --    --    INR  0.90   --    --      Recent Labs      04/28/17 0830 04/29/17 0341 04/30/17 0221   WBC  9.3  7.9  8.5   NEUTSPOLYS  71.40  54.50  54.80   LYMPHOCYTES  16.50*  30.00  30.10   MONOCYTES  8.40  10.20   10.60   EOSINOPHILS  2.70  4.40  3.50   BASOPHILS  0.60  0.50  0.60   ASTSGOT  15  13   --    ALTSGPT  14  10   --    ALKPHOSPHAT  147*  115*   --    TBILIRUBIN  0.4  0.2   --            Hemodynamics:  Temp (24hrs), Av.8 °C (98.3 °F), Min:36.4 °C (97.6 °F), Max:37.7 °C (99.9 °F)  Temperature: 36.5 °C (97.7 °F)  Pulse  Av.6  Min: 61  Max: 99   Blood Pressure: 131/71 mmHg     Respiratory:    Respiration: 18, Pulse Oximetry: 92 %        RUL Breath Sounds: Clear, RML Breath Sounds: Clear, RLL Breath Sounds: Diminished, KHRIS Breath Sounds: Clear, LLL Breath Sounds: Diminished  Fluids:    Intake/Output Summary (Last 24 hours) at 17 1329  Last data filed at 17 1300   Gross per 24 hour   Intake   2100 ml   Output   2700 ml   Net   -600 ml     Weight: 78.1 kg (172 lb 2.9 oz)  GI/Nutrition:  Orders Placed This Encounter   Procedures   • Diet Order     Standing Status: Standing      Number of Occurrences: 1      Standing Expiration Date:      Order Specific Question:  Diet:     Answer:  Diabetic [3]     Medical Decision Making, by Problem:  Active Hospital Problems    Diagnosis   • Anemia of chronic disease [D63.8]   • Obesity [E66.9]   • Anasarca [R60.1]   • Acute respiratory failure due to pulmonary edema (CMS-HCC) [J96.00]   • Type 2 diabetes mellitus with diabetic nephropathy (CMS-HCC) [E11.21]   • History of hepatitis C [Z86.19]   • Acute on chronic renal failure (CKD stage 3-4) due to progression of diabetic glumerulosclerosis (CMS-HCC) [N17.9, N18.9]   • Essential hypertension, benign [I10]   • GERD (gastroesophageal reflux disease) [K21.9]       Labs reviewed                  1 DOTTIE on CKD: I expect it is diabetic nephropathy  Pt has no uremic symptoms.  2 volume overload: better  3 Anemia  Plan  no acute need for HD  Continue lasix  Renal diet  Renal dose all meds  Avoid nephrotoxins  D/W Dr Rodriguez

## 2017-04-30 NOTE — PROGRESS NOTES
Hospital Medicine Progress Note, Adult, Complex               Author: Nathancaron Rodriguez Date & Time created: 4/30/2017  8:17 AM     Interval History:  61/F with CKD stage 3 due to diabetic glumerulosclerosis, with recent worsening during last admission (2.75-3.5), Type 2 DM, GERD, AOCD, HTN, chronic hepatitis C, morbid obesity,, admitted for worsening SOB, orthopnea, and leg swelling. Last EF normal. Crea 3.09. CXR showed pulmonary edema. Started on lasix. Nephrology consulted.     4/30/2017 - uneventful night. VSS. Afebrile. Stable on RA. No leukocytosis. Hgb stable. Crea remains elevated at 3.32. Na, K WNL. Diuresing well, 3450cc UO/24 hours. CXR (personally reviewed) showed mildly improved interstitial edema.    > Seen and examined. Breathing better. Still unable to tolerated lying flat. Leg edema better. No CP nausea, vomiting, diarrhea, abd pain.      Review of Systems:  ROS    Pertinent positives/negatives as mentioned above.     A complete review of systems was done. All other systems were negative.       Physical Exam:  Physical Exam   Constitutional: She is oriented to person, place, and time. She appears well-developed and well-nourished. No distress.   HENT:   Head: Normocephalic and atraumatic.   Mouth/Throat: No oropharyngeal exudate.   Eyes: Conjunctivae are normal. Pupils are equal, round, and reactive to light. No scleral icterus.   Neck: Normal range of motion. Neck supple.   Cardiovascular: Normal rate and regular rhythm.  Exam reveals no gallop and no friction rub.    No murmur heard.  Pulmonary/Chest: Effort normal and breath sounds normal. No respiratory distress. She has no wheezes. She has no rales. She exhibits no tenderness.   Diminished air entry B/L bases   Abdominal: Soft. Bowel sounds are normal. She exhibits no distension. There is no tenderness. There is no rebound and no guarding.   Musculoskeletal: Normal range of motion. She exhibits edema (1+ B/L LE). She exhibits no tenderness.    Lymphadenopathy:     She has no cervical adenopathy.   Neurological: She is alert and oriented to person, place, and time. No cranial nerve deficit.   Skin: Skin is warm and dry. No rash noted. No erythema. No pallor.   Psychiatric: She has a normal mood and affect. Her behavior is normal. Judgment and thought content normal.   Nursing note and vitals reviewed.      Labs:        Invalid input(s): [id number]  Recent Labs      17   TROPONINI  <0.01   BNPBTYPENAT  153*     Recent Labs      17   SODIUM  141  138  138   POTASSIUM  4.7  4.5  4.1   CHLORIDE  110  107  106   CO2  20  22  22   BUN  44*  42*  43*   CREATININE  3.09*  3.33*  3.32*   MAGNESIUM  2.0   --    --    CALCIUM  8.6  8.0*  8.0*     Recent Labs      17   ALTSGPT  14  10   --    ASTSGOT  15  13   --    ALKPHOSPHAT  147*  115*   --    TBILIRUBIN  0.4  0.2   --    LIPASE  11   --    --    GLUCOSE  98  121*  100*     Recent Labs      17   RBC  3.86*  3.50*  3.45*   HEMOGLOBIN  10.9*  9.7*  9.8*   HEMATOCRIT  33.2*  30.4*  29.5*   PLATELETCT  321  276  262   PROTHROMBTM  12.4   --    --    APTT  28.7   --    --    INR  0.90   --    --      Recent Labs      17   WBC  9.3  7.9  8.5   NEUTSPOLYS  71.40  54.50  54.80   LYMPHOCYTES  16.50*  30.00  30.10   MONOCYTES  8.40  10.20  10.60   EOSINOPHILS  2.70  4.40  3.50   BASOPHILS  0.60  0.50  0.60   ASTSGOT  15  13   --    ALTSGPT  14  10   --    ALKPHOSPHAT  147*  115*   --    TBILIRUBIN  0.4  0.2   --            Hemodynamics:  Temp (24hrs), Av.8 °C (98.3 °F), Min:36.4 °C (97.6 °F), Max:37.7 °C (99.9 °F)  Temperature: 36.8 °C (98.2 °F)  Pulse  Av  Min: 61  Max: 99   Blood Pressure: 133/72 mmHg     Respiratory:    Respiration: 18, Pulse Oximetry: 94 %        RUL Breath Sounds: Clear, RML Breath Sounds:  Clear, RLL Breath Sounds: Diminished, KHRIS Breath Sounds: Clear, LLL Breath Sounds: Diminished  Fluids:    Intake/Output Summary (Last 24 hours) at 04/30/17 0817  Last data filed at 04/30/17 0606   Gross per 24 hour   Intake   1220 ml   Output   3450 ml   Net  -2230 ml        GI/Nutrition:  Orders Placed This Encounter   Procedures   • Diet Order     Standing Status: Standing      Number of Occurrences: 1      Standing Expiration Date:      Order Specific Question:  Diet:     Answer:  Diabetic [3]     Medical Decision Making, by Problem:    Acute respiratory failure due to pulmonary edema, anasarca, POA  - will maintain on IV lasix 40mg q12H. Continue strict I&O, and daily weights.  - had recent echo, no need to repeat another one. EF normal.   - continue RT protocol, PRN O2.     Acute-on-chronic renal failure (CKD 3-4), due to progression of glumerulosclerosis,   - had recent worsening of renal function but has been stable since recent admission.  - nephrology on-board, may need to go on dialysis if renal function does not improve in next few days.   - Continue lasix as above. Monitor renal function. BMP in AM.  - avoid nephrotoxins. Renal dose adjust meds.     Type 2 diabetes mellitus with nephropathy  - hold amaryl for now. Continue lantus and SSI. Diabetic diet. Accucheck AC and HS.     HTN  - continue norvasc, coreg, clonidine.     Anemia of chronic disease  - Monitor Hgb daily.     GERD  - continue PPI.    Obesity  - Body mass index is 34.03 kg/(m^2).    Dispo - monitor on the floors.       Labs reviewed, Medications reviewed and Radiology images reviewed  Jhaveri catheter: No Jhaveri      DVT Prophylaxis: Heparin    Ulcer prophylaxis: Yes    Assessed for rehab: Patient returned to prior level of function, rehabilitation not indicated at this time

## 2017-05-01 VITALS
DIASTOLIC BLOOD PRESSURE: 68 MMHG | WEIGHT: 170.86 LBS | SYSTOLIC BLOOD PRESSURE: 143 MMHG | RESPIRATION RATE: 18 BRPM | HEIGHT: 61 IN | TEMPERATURE: 97.7 F | BODY MASS INDEX: 32.26 KG/M2 | HEART RATE: 65 BPM | OXYGEN SATURATION: 93 %

## 2017-05-01 PROBLEM — J96.00 ACUTE RESPIRATORY FAILURE (HCC): Status: RESOLVED | Noted: 2017-04-28 | Resolved: 2017-05-01

## 2017-05-01 PROBLEM — R60.1 ANASARCA: Status: RESOLVED | Noted: 2017-04-28 | Resolved: 2017-05-01

## 2017-05-01 LAB
ANION GAP SERPL CALC-SCNC: 9 MMOL/L (ref 0–11.9)
BUN SERPL-MCNC: 42 MG/DL (ref 8–22)
CALCIUM SERPL-MCNC: 8 MG/DL (ref 8.5–10.5)
CHLORIDE SERPL-SCNC: 103 MMOL/L (ref 96–112)
CO2 SERPL-SCNC: 23 MMOL/L (ref 20–33)
CREAT SERPL-MCNC: 3.48 MG/DL (ref 0.5–1.4)
GFR SERPL CREATININE-BSD FRML MDRD: 13 ML/MIN/1.73 M 2
GLUCOSE BLD-MCNC: 102 MG/DL (ref 65–99)
GLUCOSE BLD-MCNC: 82 MG/DL (ref 65–99)
GLUCOSE SERPL-MCNC: 82 MG/DL (ref 65–99)
POTASSIUM SERPL-SCNC: 4.2 MMOL/L (ref 3.6–5.5)
SODIUM SERPL-SCNC: 135 MMOL/L (ref 135–145)

## 2017-05-01 PROCEDURE — 700111 HCHG RX REV CODE 636 W/ 250 OVERRIDE (IP): Performed by: HOSPITALIST

## 2017-05-01 PROCEDURE — 700102 HCHG RX REV CODE 250 W/ 637 OVERRIDE(OP): Performed by: INTERNAL MEDICINE

## 2017-05-01 PROCEDURE — 99239 HOSP IP/OBS DSCHRG MGMT >30: CPT | Performed by: INTERNAL MEDICINE

## 2017-05-01 PROCEDURE — 36415 COLL VENOUS BLD VENIPUNCTURE: CPT

## 2017-05-01 PROCEDURE — 82962 GLUCOSE BLOOD TEST: CPT | Mod: 91

## 2017-05-01 PROCEDURE — A9270 NON-COVERED ITEM OR SERVICE: HCPCS | Performed by: HOSPITALIST

## 2017-05-01 PROCEDURE — A9270 NON-COVERED ITEM OR SERVICE: HCPCS | Performed by: INTERNAL MEDICINE

## 2017-05-01 PROCEDURE — 700102 HCHG RX REV CODE 250 W/ 637 OVERRIDE(OP): Performed by: HOSPITALIST

## 2017-05-01 PROCEDURE — 80048 BASIC METABOLIC PNL TOTAL CA: CPT

## 2017-05-01 RX ORDER — FUROSEMIDE 40 MG/1
40 TABLET ORAL
Qty: 60 TAB | Refills: 1 | Status: SHIPPED | OUTPATIENT
Start: 2017-05-06

## 2017-05-01 RX ADMIN — STANDARDIZED SENNA CONCENTRATE AND DOCUSATE SODIUM 2 TABLET: 8.6; 5 TABLET, FILM COATED ORAL at 08:22

## 2017-05-01 RX ADMIN — FUROSEMIDE 40 MG: 10 INJECTION, SOLUTION INTRAVENOUS at 06:06

## 2017-05-01 RX ADMIN — INSULIN GLARGINE 40 UNITS: 100 INJECTION, SOLUTION SUBCUTANEOUS at 08:23

## 2017-05-01 RX ADMIN — HEPARIN SODIUM 5000 UNITS: 5000 INJECTION, SOLUTION INTRAVENOUS; SUBCUTANEOUS at 06:06

## 2017-05-01 RX ADMIN — AMLODIPINE BESYLATE 10 MG: 10 TABLET ORAL at 08:21

## 2017-05-01 RX ADMIN — CARVEDILOL 6.25 MG: 6.25 TABLET, FILM COATED ORAL at 08:21

## 2017-05-01 RX ADMIN — OXYCODONE HYDROCHLORIDE 5 MG: 5 TABLET ORAL at 06:06

## 2017-05-01 ASSESSMENT — PAIN SCALES - GENERAL
PAINLEVEL_OUTOF10: 3
PAINLEVEL_OUTOF10: 7
PAINLEVEL_OUTOF10: 3
PAINLEVEL_OUTOF10: 4

## 2017-05-01 NOTE — DISCHARGE INSTRUCTIONS
Discharge Instructions    Discharged to home by car with relative. Discharged via wheelchair, hospital escort: Yes.  Special equipment needed: Not Applicable    Be sure to schedule a follow-up appointment with your primary care doctor or any specialists as instructed.     Discharge Plan:   Diet Plan: Discussed  Activity Level: Discussed  Confirmed Follow up Appointment: Patient to Call and Schedule Appointment  Confirmed Symptoms Management: Discussed  Medication Reconciliation Updated: Yes  Influenza Vaccine Indication: Patient Refuses    I understand that a diet low in cholesterol, fat, and sodium is recommended for good health. Unless I have been given specific instructions below for another diet, I accept this instruction as my diet prescription.   Other diet: Diabetic, low sodium, 1500 fluid restriction      Special Instructions: None    · Is patient discharged on Warfarin / Coumadin?   No     · Is patient Post Blood Transfusion?  No    Depression / Suicide Risk    As you are discharged from this [facility] facility, it is important to learn how to keep safe from harming yourself.    Recognize the warning signs:  · Abrupt changes in personality, positive or negative- including increase in energy   · Giving away possessions  · Change in eating patterns- significant weight changes-  positive or negative  · Change in sleeping patterns- unable to sleep or sleeping all the time   · Unwillingness or inability to communicate  · Depression  · Unusual sadness, discouragement and loneliness  · Talk of wanting to die  · Neglect of personal appearance   · Rebelliousness- reckless behavior  · Withdrawal from people/activities they love  · Confusion- inability to concentrate     If you or a loved one observes any of these behaviors or has concerns about self-harm, here's what you can do:  · Talk about it- your feelings and reasons for harming yourself  · Remove any means that you might use to hurt yourself (examples:  "pills, rope, extension cords, firearm)  · Get professional help from the community (Mental Health, Substance Abuse, psychological counseling)  · Do not be alone:Call your Safe Contact- someone whom you trust who will be there for you.  · Call your local CRISIS HOTLINE 328-0251 or 962-085-9636  · Call your local Children's Mobile Crisis Response Team [place] (655) 175-1368 or www.[employer]  · Call the toll free National Suicide Prevention Hotlines   · National Suicide Prevention Lifeline 189-317-UPFV (4648)  · National Hope Line Network 800-SUICIDE (323-2118)  1.5 Gram Low Sodium Diet  A 1.5 gram sodium diet restricts the amount of sodium in the diet to no more than 1.5 g or 1500 mg daily. The American Heart Association recommends Americans over the age of 20 to consume no more than 1500 mg of sodium each day to reduce the risk of developing high blood pressure. Research also shows that limiting sodium may reduce heart attack and stroke risk. Many foods contain sodium for flavor and sometimes as a preservative. When the amount of sodium in a diet needs to be low, it is important to know what to look for when choosing foods and drinks. The following includes some information and guidelines to help make it easier for you to adapt to a low sodium diet.  QUICK TIPS  · Do not add salt to food.  · Avoid convenience items and fast food.  · Choose unsalted snack foods.  · Buy lower sodium products, often labeled as \"lower sodium\" or \"no salt added.\"  · Check food labels to learn how much sodium is in 1 serving.  · When eating at a restaurant, ask that your food be prepared with less salt or none, if possible.  READING FOOD LABELS FOR SODIUM INFORMATION  The nutrition facts label is a good place to find how much sodium is in foods. Look for products with no more than 400 mg of sodium per serving.  Remember that 1.5 g = 1500 mg.  The food label may also list foods as:  · Sodium-free: Less than 5 mg in a " serving.  · Very low sodium: 35 mg or less in a serving.  · Low-sodium: 140 mg or less in a serving.  · Light in sodium: 50% less sodium in a serving. For example, if a food that usually has 300 mg of sodium is changed to become light in sodium, it will have 150 mg of sodium.  · Reduced sodium: 25% less sodium in a serving. For example, if a food that usually has 400 mg of sodium is changed to reduced sodium, it will have 300 mg of sodium.  CHOOSING FOODS  Grains  · Avoid: Salted crackers and snack items. Some cereals, including instant hot cereals. Bread stuffing and biscuit mixes. Seasoned rice or pasta mixes.  · Choose: Unsalted snack items. Low-sodium cereals, oats, puffed wheat and rice, shredded wheat. English muffins and bread. Pasta.  Meats  · Avoid: Salted, canned, smoked, spiced, pickled meats, including fish and poultry. [place], ham, sausage, cold cuts, hot dogs, anchovies.  · Choose: Low-sodium canned tuna and salmon. Fresh or frozen meat, poultry, and fish.  Dairy  · Avoid: Processed cheese and spreads. Cottage cheese. Buttermilk and condensed milk. Regular cheese.  · Choose: Milk. Low-sodium cottage cheese. Yogurt. Sour cream. Low-sodium cheese.  Fruits and Vegetables  · Avoid: Regular canned vegetables. Regular canned tomato sauce and paste. Frozen vegetables in sauces. Olives. Pickles. Relishes. Sauerkraut.  · Choose: Low-sodium canned vegetables. Low-sodium tomato sauce and paste. Frozen or fresh vegetables. Fresh and frozen fruit.  Condiments  · Avoid: Canned and packaged gravies. Worcestershire sauce. Tartar sauce. Barbecue sauce. Soy sauce. Steak sauce. Ketchup. Onion, garlic, and table salt. Meat flavorings and tenderizers.  · Choose: Fresh and dried herbs and spices. Low-sodium varieties of mustard and ketchup. Lemon juice. Tabasco sauce. Horseradish.  SAMPLE 1.5 GRAM SODIUM MEAL PLAN  Breakfast / Sodium (mg)  · 1 cup low-fat milk / 143 mg  · 1 whole-wheat English muffin / 240 mg  · 1 tbs  heart-healthy margarine / 153 mg  · 1 hard-boiled egg / 139 mg  · 1 small orange / 0 mg  Lunch / Sodium (mg)  · 1 cup raw carrots / 76 mg  · 2 tbs no salt added peanut butter / 5 mg  · 2 slices whole-wheat bread / 270 mg  · 1 tbs jelly / 6 mg  · ½ cup red grapes / 2 mg  Dinner / Sodium (mg)  · 1 cup whole-wheat pasta / 2 mg  · 1 cup low-sodium tomato sauce / 73 mg  · 3 oz lean ground beef / 57 mg  · 1 small side salad (1 cup raw spinach leaves, ½ cup cucumber, ¼ cup yellow bell pepper) with 1 tsp olive oil and 1 tsp red wine vinegar / 25 mg  Snack / Sodium (mg)  · 1 container low-fat vanilla yogurt / 107 mg  · 3 dalia cracker squares / 127 mg  Nutrient Analysis  · Calories: 1745  · Protein: 75 g  · Carbohydrate: 237 g  · Fat: 57 g  · Sodium: 1425 mg  Document Released: 12/18/2006 Document Revised: 03/11/2013 Document Reviewed: 03/21/2011  ExitCare® Patient Information ©2014 [employer].      Furosemide tablets  What is this medicine?  FUROSEMIDE (fyoor [place] se mide) is a diuretic. It helps you make more urine and to lose salt and excess water from your body. This medicine is used to treat high blood pressure, and edema or swelling from heart, kidney, or liver disease.  This medicine may be used for other purposes; ask your health care provider or pharmacist if you have questions.  COMMON BRAND NAME(S): Twan Dave  What should I tell my health care provider before I take this medicine?  They need to know if you have any of these conditions:  -abnormal blood electrolytes  -diarrhea or vomiting  -gout  -heart disease  -kidney disease, small amounts of urine, or difficulty passing urine  -liver disease  -an unusual or allergic reaction to furosemide, sulfa drugs, other medicines, foods, dyes, or preservatives  -pregnant or trying to get pregnant  -breast-feeding  How should I use this medicine?  Take this medicine by mouth with a glass of water. Follow the directions on the prescription label. You may take this  medicine with or without food. If it upsets your stomach, take it with food or milk. Do not take your medicine more often than directed. Remember that you will need to pass more urine after taking this medicine. Do not take your medicine at a time of day that will cause you problems. Do not take at bedtime.  Talk to your pediatrician regarding the use of this medicine in children. While this drug may be prescribed for selected conditions, precautions do apply.  Overdosage: If you think you have taken too much of this medicine contact a poison control center or emergency room at once.  NOTE: This medicine is only for you. Do not share this medicine with others.  What if I miss a dose?  If you miss a dose, take it as soon as you can. If it is almost time for your next dose, take only that dose. Do not take double or extra doses.  What may interact with this medicine?  -aspirin and aspirin-like medicines  -certain antibiotics  -chloral hydrate  -cisplatin  -cyclosporine  -digoxin  -diuretics  -laxatives  -lithium  -medicines for blood pressure  -medicines that relax muscles for surgery  -methotrexate  -NSAIDs, medicines for pain and inflammation like ibuprofen, naproxen, or indomethacin  -phenytoin  -steroid medicines like prednisone or cortisone  -sucralfate  This list may not describe all possible interactions. Give your health care provider a list of all the medicines, herbs, non-prescription drugs, or dietary supplements you use. Also tell them if you smoke, drink alcohol, or use illegal drugs. Some items may interact with your medicine.  What should I watch for while using this medicine?  Visit your doctor or health care professional for regular checks on your progress. Check your blood pressure regularly. Ask your doctor or health care professional what your blood pressure should be, and when you should contact him or her. If you are a diabetic, check your blood sugar as directed.  You may need to be on a special  diet while taking this medicine. Check with your doctor. Also, ask how many glasses of fluid you need to drink a day. You must not get dehydrated.  You may get drowsy or dizzy. Do not drive, use machinery, or do anything that needs mental alertness until you know how this drug affects you. Do not stand or sit up quickly, especially if you are an older patient. This reduces the risk of dizzy or fainting spells. Alcohol can make you more drowsy and dizzy. Avoid alcoholic drinks.  This medicine can make you more sensitive to the sun. Keep out of the sun. If you cannot avoid being in the sun, wear protective clothing and use sunscreen. Do not use sun lamps or tanning beds/booths.  What side effects may I notice from receiving this medicine?  Side effects that you should report to your doctor or health care professional as soon as possible:  -blood in urine or stools  -dry mouth  -fever or chills  -hearing loss or ringing in the ears  -irregular heartbeat  -muscle pain or weakness, cramps  -skin rash  -stomach upset, pain, or nausea  -tingling or numbness in the hands or feet  -unusually weak or tired  -vomiting or diarrhea  -yellowing of the eyes or skin  Side effects that usually do not require medical attention (report to your doctor or health care professional if they continue or are bothersome):  -headache  -loss of appetite  -unusual bleeding or bruising  This list may not describe all possible side effects. Call your doctor for medical advice about side effects. You may report side effects to FDA at 2-752-FDA-6187.  Where should I keep my medicine?  Keep out of the reach of children.  Store at room temperature between 15 and 30 degrees C (59 and 86 degrees F). Protect from light. Throw away any unused medicine after the expiration date.  NOTE: This sheet is a summary. It may not cover all possible information. If you have questions about this medicine, talk to your doctor, pharmacist, or health care provider.  ©  2014, [employer]/Gold Standard. (12/6/2010 4:24:50 PM)

## 2017-05-01 NOTE — DISCHARGE SUMMARY
HOSPITAL MEDICINE DISCHARGE SUMMARY    Name: Veda Ford  MRN: [id number]  : 1955  Admit Date: 2017  Discharge Date: 2017  Attending Provider: Nathan Rodriguez M.D.  Primary Care Physician: Jesika Reese N.P.    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     For further details of history of present illness, past medical, social and    family histories, allergies, and medications, please refer to admission H and    P by Dr. Mary Dixon on 2017.     HOSPITAL COURSE:  The patient was admitted to the hospitalist service after    being initially evaluated in the emergency department.  Her creatinine was 3.0  which was within the range of creatinine during her last admission.  Chest x-ray, however,    showed pulmonary edema.  She was started on IV Lasix.  Nephrology was    consulted, who agreed on IV diuresis.     She diuresed well, with improvement in edema, and resolution of her orthopnea.    Her sense of well being also improved.  However, creatinine remained    elevated.  Repeat  chest x-ray showed improvement in the interstitial edema.     Nephrology recommended continuing the diuresis on discharge.     With her clinical improvement, she was deemed ready to be discharged from the    hospital as she did not have any further inpatient needs.  Patient felt    comfortable going home.     The discharge plan was discussed with the patient with which she was agreeable   to.     The patient was subsequently sent home in improved and stable condition.       FOLLOW-UP ISSUES:   1. Anasarca/renal failure - she will be continued on lasix 40mg PO BID. She will follow-up   with nephrology (Dr. Warner) in 1-2 weeks.      CHANGES IN MANAGEMENT OF CHRONIC CONDITION: As above.     PENDING TESTS: none    FOLLOW-UP TESTS ORDERED POST DISCHARGE: none    DISCHARGE MEDICATIONS:   Medication Sig   furosemide (LASIX) 40 MG Tab Take 1 Tab by mouth BID 2 DAYS A WEEK.   carvedilol (COREG) 6.25 MG Tab Take 1 Tab by mouth 2 times a day, with meals.   amlodipine (NORVASC) 10 MG Tab Take 1 Tab by mouth 2 Times a Day.   glimepiride (AMARYL) 4 MG Tab Take 4 mg by mouth 1 time daily as needed.   esomeprazole (NEXIUM) 40 MG delayed-release capsule Take 40 mg by mouth as needed (For heartburn).   loratadine (CLARITIN) 10 MG Tab Take 10 mg by mouth 1 time daily as needed.   atorvastatin (LIPITOR) 40 MG Tab Take 40 mg by mouth every evening.   clonidine (CATAPRES) 0.1 MG Tab Take 0.1 mg by mouth every evening.   insulin glargine (LANTUS) 100 UNIT/ML SOLN Inject 40 Units as instructed See Admin Instructions. Takes every morning but holds the PM dose often due to low sugars   insulin lispro (HUMALOG KWIKPEN) 100 UNIT/ML SOLN Inject 2-6 Units as instructed 3 times a day before meals. 151-200 2 units  201-250 3 units  251-300 5 units  301-350 6 units  351-400 8 units  Over 400 9 units and call MD          FOLLOW-UP APPOINTMENTS:   1. PCP in 1-2 weeks.  2. Nephrology (Dr. Warner) in 1-2 weeks      For further details on discharge  medications, patient education, diet, and activity, please refer to electronic copy of discharge instructions.       TIME SPENT: 43 minutes, with greater than 50% of the time spent on face-to-face encounter, addressing medical issues, coordination of care, counseling, discharge planning, medication reconciliation, and documentation.

## 2017-05-03 ENCOUNTER — APPOINTMENT (OUTPATIENT)
Dept: RADIOLOGY | Facility: MEDICAL CENTER | Age: 62
End: 2017-05-03
Attending: INTERNAL MEDICINE
Payer: MEDICAID

## 2017-05-17 ENCOUNTER — OFFICE VISIT (OUTPATIENT)
Dept: NEPHROLOGY | Facility: MEDICAL CENTER | Age: 62
End: 2017-05-17
Payer: MEDICAID

## 2017-05-17 VITALS
HEART RATE: 71 BPM | WEIGHT: 165 LBS | SYSTOLIC BLOOD PRESSURE: 128 MMHG | TEMPERATURE: 98.2 F | OXYGEN SATURATION: 97 % | HEIGHT: 61 IN | DIASTOLIC BLOOD PRESSURE: 70 MMHG | RESPIRATION RATE: 16 BRPM | BODY MASS INDEX: 31.15 KG/M2

## 2017-05-17 DIAGNOSIS — D63.8 ANEMIA OF CHRONIC DISEASE: ICD-10-CM

## 2017-05-17 DIAGNOSIS — N18.5 CKD (CHRONIC KIDNEY DISEASE), STAGE V (HCC): ICD-10-CM

## 2017-05-17 DIAGNOSIS — D64.9 ANEMIA, UNSPECIFIED TYPE: ICD-10-CM

## 2017-05-17 DIAGNOSIS — E55.9 VITAMIN D DEFICIENCY DISEASE: ICD-10-CM

## 2017-05-17 DIAGNOSIS — N25.81 HYPERPARATHYROIDISM DUE TO RENAL INSUFFICIENCY (HCC): ICD-10-CM

## 2017-05-17 DIAGNOSIS — E11.21 DIABETIC NEPHROPATHY ASSOCIATED WITH TYPE 2 DIABETES MELLITUS (HCC): ICD-10-CM

## 2017-05-17 DIAGNOSIS — I10 ESSENTIAL HYPERTENSION, BENIGN: ICD-10-CM

## 2017-05-17 PROBLEM — N18.9 ACUTE ON CHRONIC RENAL FAILURE (HCC): Status: RESOLVED | Noted: 2017-04-28 | Resolved: 2017-05-17

## 2017-05-17 PROBLEM — N17.9 ACUTE ON CHRONIC RENAL FAILURE (HCC): Status: RESOLVED | Noted: 2017-04-28 | Resolved: 2017-05-17

## 2017-05-17 PROCEDURE — 99214 OFFICE O/P EST MOD 30 MIN: CPT | Performed by: INTERNAL MEDICINE

## 2017-05-17 NOTE — PROGRESS NOTES
Nephrology Progress Note, Adult, Complex               Author: Remedios Timmy Date & Time created: 5/17/2017  4:24 PM     Interval History:  60 y/o female with DMN biopsy proven , progression of CKD to stage V  (+)nephrotic range proteinuria  Doing better, no complaints  No uremic symptoms  HTN: BP well controlled, compliant to low Na diet    Review of Systems:  Review of Systems   Constitutional: Improved malaise/fatigue. Negative for fever and chills.   HENT: Negative for congestion, nosebleeds and sore throat.    Eyes: Negative.    Respiratory: Negative for cough, hemoptysis and wheezing.    Cardiovascular: Negative for chest pain, palpitations, orthopnea and leg swelling.   Gastrointestinal: Negative for heartburn, nausea, vomiting, abdominal pain and diarrhea.   Genitourinary: Negative for dysuria, urgency, frequency, hematuria and flank pain.   Musculoskeletal: Negative for myalgias, back pain, joint pain and neck pain.   Skin: Negative for itching and rash.   Neurological: Negative for dizziness, sensory change, focal weakness and headaches.       Physical Exam:  Physical Exam   Constitutional: She is oriented to person, place, and time. She appears well-developed and well-nourished. No distress.   HENT:   Head: Normocephalic and atraumatic.   Mouth/Throat: Oropharynx is clear and moist.   Eyes: Conjunctivae and EOM are normal. Pupils are equal, round, and reactive to light.   Neck: Normal range of motion. Neck supple.   Cardiovascular: Normal rate and regular rhythm.  Exam reveals no gallop and no friction rub.    Pulmonary/Chest: Effort normal and breath sounds normal. No respiratory distress. She has no wheezes. She has no rales.   Abdominal: Soft. Bowel sounds are normal. She exhibits no distension and no mass. There is no tenderness.   Musculoskeletal: She exhibits no edema.   Neurological: She is alert and oriented to person, place, and time. No cranial nerve deficit. Coordination normal.   Skin: Skin is  warm. No rash noted. No erythema.   Nursing note and vitals reviewed.        Hemodynamics:  Temp (24hrs), Av.7 °C (98.1 °F), Min:36.3 °C (97.4 °F), Max:37.1 °C (98.7 °F)  Temperature: 36.8 °C (98.2 °F)  Pulse  Av.6  Min: 61  Max: 90   Blood Pressure: 128/70 mmHg        Weight: 74.844 kg (165 lb)    Labs reviewed and Medications reviewed    Lab Results   Component Value Date/Time    CREATININE 3.48* 2017 03:52 AM    POTASSIUM 4.2 2017 03:52 AM           Assessment and plan    1.CKD V with nephrotic range proteinuria  due to DMN per biopsy  2.HTN: BP well controlled  3.Electrolytes: well controlled.  4.Anemia: Hb to montior  5.Volume ; edema better with Lasix      Recs: no uremic symptoms             No need for emergent dialysis             Avoid nephrotoxic agents                       Predialysis education             F/u in 2 weeks with CBC. BMP

## 2017-05-17 NOTE — MR AVS SNAPSHOT
"        Veda Ford   2017 2:00 PM   Office Visit   MRN: [id number]    Department:  [facility]   Dept Phone:  [phone]    Description:  Female : 1955   Provider:  Remedios Warner M.D.           Reason for Visit     Follow-Up           Allergies as of 2017     Allergen Noted Reactions    Gabapentin 2015   Unspecified    \"Leg aches and pains\"  RXN=2016      You were diagnosed with     CKD (chronic kidney disease), stage V (CMS-HCC)   [749574]       Diabetic nephropathy associated with type 2 diabetes mellitus (CMS-HCC)   [6929874]       Essential hypertension, benign   [401.1.ICD-9-CM]       Anemia of chronic disease   [5146100]       Anemia, unspecified type   [7870737]       Vitamin D deficiency disease   [813980]       Hyperparathyroidism due to renal insufficiency (CMS-HCC)   [606508]         Vital Signs     Blood Pressure Pulse Temperature Respirations Height Weight    128/70 mmHg 71 36.8 °C (98.2 °F) 16 1.549 m (5' 0.98\") 74.844 kg (165 lb)    Body Mass Index Oxygen Saturation Smoking Status             31.19 kg/m2 97% Never Smoker          Basic Information     Date Of Birth Sex Race Ethnicity Preferred Language    1955 Female  or   Origin (Syrian,Kenyan,Northern Irish,Russian, etc) English      Your appointments     May 22, 2017 10:00 AM   Nm 59 with [facility] NM CARDIAC PET   [facility]    [address]  [place] [address]   [phone]            2017 11:30 AM   Follow Up Visit with Remedios Warner M.D.   [facility] ([address] [place] [address])    [address] [place] [address] [address]  [place] [id number] [phone]           You will be receiving a confirmation call a few days before your appointment from our automated call confirmation system.            2017  3:15 PM   FOLLOW UP with Lyly Leary M.D.   [facility] (--)    [address]  [place] [address]   [phone]              Problem List              ICD-10-CM Priority Class Noted - Resolved    Essential hypertension, benign I10   7/17/2009 - Present    Other and unspecified hyperlipidemia E78.5   7/17/2009 - Present    GERD (gastroesophageal reflux disease) K21.9   7/17/2009 - Present    Vitamin d deficiency    7/17/2009 - Present    Diabetes  High  7/17/2009 - Present    Shoulder joint pain M25.519   7/17/2009 - Present    Carpal tunnel syndrome G56.00   7/17/2009 - Present    Rotator cuff tear    9/4/2009 - Present    Diabetic neuropathy (CMS-HCC) E11.40   8/6/2010 - Present    Myalgia M79.1   8/6/2010 - Present    Renal insufficiency N28.9   11/15/2011 - Present    Hypothyroid E03.9   11/15/2011 - Present    Microalbuminuria R80.9   11/15/2011 - Present    ARF (acute renal failure) (CMS-HCC) N17.9 High  4/26/2012 - Present    Hyperglycemia R73.9 High  4/26/2012 - Present    Lower GI bleed K92.2   9/5/2013 - Present    DIABETES MELLITUS    9/6/2013 - Present    Acute blood loss anemia D62   9/6/2013 - Present    Chest pain R07.9   12/3/2015 - Present    Cholecystolithiasis K80.20   3/16/2016 - Present    Anasarca associated with disorder of kidney N04.9   3/27/2017 - Present    Renal failure (ARF), acute on chronic (CMS-HCC) N17.9, N18.9   3/29/2017 - Present    Type 2 diabetes mellitus with diabetic nephropathy (CMS-HCC) E11.21   4/28/2017 - Present    History of hepatitis C Z86.19   4/28/2017 - Present    Anemia of chronic disease D63.8   4/29/2017 - Present    Obesity E66.9   4/29/2017 - Present    CKD (chronic kidney disease), stage V (CMS-HCC) N18.5   5/17/2017 - Present    Vitamin D deficiency disease E55.9   5/17/2017 - Present    Hyperparathyroidism due to renal insufficiency (CMS-HCC) N25.81   5/17/2017 - Present      Health Maintenance        Date Due Completion Dates    DIABETES MONOFILAMENT / LE EXAM 3/27/1955 ---    RETINAL SCREENING  9/27/1972 ---    IMM DTaP/Tdap/Td Vaccine (1 - Tdap) 9/27/1973 ---    IMM PNEUMOCOCCAL 19-64 (ADULT) MEDIUM RISK SERIES (1 of 1 - PPSV23) 9/27/1973 ---    PAP SMEAR 9/27/1975 ---    MAMMOGRAM 9/27/1994 ---    URINE ACR / MICROALBUMIN 11/12/2012 11/12/2011, 4/13/2010, 12/15/2009    IMM ZOSTER VACCINE 9/27/2014 ---    A1C SCREENING 6/4/2016 12/4/2015, 4/26/2012, 11/12/2011, 6/21/2011, 12/6/2010, 4/13/2010, 12/15/2009, 1/5/2009, 8/26/2008, 2/27/2008, 7/10/2007, 3/24/2007, 8/29/2006    FASTING LIPID PROFILE 12/4/2016 12/4/2015, 4/26/2012, 11/12/2011, 6/21/2011, 12/6/2010, 4/13/2010, 12/15/2009, 7/31/2009, 8/26/2008, 2/27/2008, 7/10/2007, 3/24/2007, 8/29/2006    SERUM CREATININE 5/1/2018 5/1/2017, 4/30/2017, 4/29/2017, 4/28/2017, 4/1/2017, 3/31/2017, 3/30/2017, 3/29/2017, 3/27/2017, 3/27/2017, 3/15/2016, 1/15/2016, 12/4/2015, 12/3/2015, 9/7/2013, 9/6/2013, 9/5/2013, 4/27/2012, 4/26/2012, 4/25/2012, 11/12/2011, 6/21/2011, 3/4/2011, 12/6/2010, 4/13/2010, 12/15/2009, 7/31/2009, 1/5/2009, 8/26/2008, 2/27/2008, 7/10/2007, 3/24/2007, 8/29/2006    COLONOSCOPY 9/6/2023 9/6/2013            Current Immunizations     Influenza TIV (IM) 9/5/2013  8:29 PM, 11/15/2011, 10/20/2010, 10/7/2009    Influenza Vaccine Adult HD 12/1/2016      Below and/or attached are the medications your provider expects you to take. Review all of your home medications and newly ordered medications with your provider and/or pharmacist. Follow medication instructions as directed by your provider and/or pharmacist. Please keep your medication list with you and share with your provider. Update the information when medications are discontinued, doses are changed, or new medications (including over-the-counter products) are added; and carry medication information at all times in the event of emergency situations     Allergies:  GABAPENTIN - Unspecified               Medications  Valid as of: May 17, 2017 -  2:23 PM    Generic Name Brand Name Tablet Size  Instructions for use    AmLODIPine Besylate (Tab) NORVASC 10 MG Take 1 Tab by mouth 2 Times a Day.        Atorvastatin Calcium (Tab) LIPITOR 40 MG Take 40 mg by mouth every evening.        Carvedilol (Tab) COREG 6.25 MG Take 1 Tab by mouth 2 times a day, with meals.        CloNIDine HCl (Tab) CATAPRES 0.1 MG Take 0.1 mg by mouth every evening.        Esomeprazole Magnesium (CAPSULE DELAYED RELEASE) NEXIUM 40 MG Take 40 mg by mouth as needed (For heartburn).        Furosemide (Tab) LASIX 40 MG Take 1 Tab by mouth BID 2 DAYS A WEEK.        Glimepiride (Tab) AMARYL 4 MG Take 4 mg by mouth 1 time daily as needed.        Insulin Glargine (Solution) LANTUS 100 UNIT/ML Inject 40 Units as instructed See Admin Instructions. Takes every morning but holds the PM dose often due to low sugars        Insulin Lispro (Solution) HUMALOG 100 UNIT/ML Inject 2-6 Units as instructed 3 times a day before meals. 151-200 2 units  201-250 3 units  251-300 5 units  301-350 6 units  351-400 8 units  Over 400 9 units and call MD        Loratadine (Tab) CLARITIN 10 MG Take 10 mg by mouth 1 time daily as needed.        .                 Medicines prescribed today were sent to:     [id number] - [place], [place] - [address] [address]    Phone: [phone] Fax: [phone]    Open 24 Hours?: No      Medication refill instructions:       If your prescription bottle indicates you have medication refills left, it is not necessary to call your provider’s office. Please contact your pharmacy and they will refill your medication.    If your prescription bottle indicates you do not have any refills left, you may request refills at any time through one of the following ways: The online CSDN system (except Urgent Care), by calling your provider’s office, or by asking your pharmacy to contact your provider’s office with a refill request. Medication refills are processed only during regular business hours and may  not be available until the next business day. Your provider may request additional information or to have a follow-up visit with you prior to refilling your medication.   *Please Note: Medication refills are assigned a new Rx number when refilled electronically. Your pharmacy may indicate that no refills were authorized even though a new prescription for the same medication is available at the pharmacy. Please request the medicine by name with the pharmacy before contacting your provider for a refill.        Your To Do List     Future Labs/Procedures Complete By Expires    BASIC METABOLIC PANEL  As directed 11/14/2017    CBC WITHOUT DIFFERENTIAL  As directed 11/14/2017    PHOSPHORUS  As directed 11/14/2017    PTH INTACT (PTH ONLY)  As directed 5/18/2018      Other Notes About Your Plan     +UDS.  No further narcotics           MyChart Status: Patient Declined

## 2017-05-22 ENCOUNTER — HOSPITAL ENCOUNTER (OUTPATIENT)
Dept: RADIOLOGY | Facility: MEDICAL CENTER | Age: 62
End: 2017-05-22
Attending: INTERNAL MEDICINE
Payer: MEDICAID

## 2017-05-22 DIAGNOSIS — N18.3 CKD (CHRONIC KIDNEY DISEASE), STAGE 3 (MODERATE): ICD-10-CM

## 2017-05-22 DIAGNOSIS — I10 ESSENTIAL HYPERTENSION, BENIGN: ICD-10-CM

## 2017-05-22 DIAGNOSIS — E08.42 DIABETIC POLYNEUROPATHY ASSOCIATED WITH DIABETES MELLITUS DUE TO UNDERLYING CONDITION (HCC): ICD-10-CM

## 2017-05-22 DIAGNOSIS — R07.2 PRECORDIAL PAIN: ICD-10-CM

## 2017-05-22 PROCEDURE — 700111 HCHG RX REV CODE 636 W/ 250 OVERRIDE (IP)

## 2017-05-22 PROCEDURE — A9555 RB82 RUBIDIUM: HCPCS

## 2017-05-23 ENCOUNTER — TELEPHONE (OUTPATIENT)
Dept: CARDIOLOGY | Facility: MEDICAL CENTER | Age: 62
End: 2017-05-23

## 2017-05-23 NOTE — TELEPHONE ENCOUNTER
ET DICTATED RESULTS   Status: Preliminary result     Visible to patient:  Not Released               Notes Recorded by Lyly Leary M.D. on 5/23/2017 at 11:12 AM  Dear Sharri,    Can you please let Veda Ford know that result is ok and I will see patient as scheduled?    Thanks Cameron Harrell.

## 2017-05-23 NOTE — PROGRESS NOTES
Quick Note:    Dear Sharri,    Can you please let Veda Ford know that result is ok and I will see patient as scheduled?    Thanks Cameron Harrell.    ______

## 2017-05-23 NOTE — PROCEDURES
DATE OF PROCEDURE:  05/22/2017    REFERRING PHYSICIAN:  Cameron Leary MD    AGE:  61.  GENDER:  Female.  HEIGHT:  61 inches.  WEIGHT:  176 pounds.  BMI:       INDICATIONS:  Chest pain.     MEDICATIONS:       PROCEDURE:  The patient reviewed and signed the acknowledgement for testing   form.  The patient was in a fasting state and was properly prepared for   testing.  An intravenous line was inserted and a flush of normal saline   followed to insure line patency.     A transmission scan was acquired for attenuation correction using the internal   Germanium sources.  The patient was then administered 29.2 mCi of   Rubidium-82.  Approximately 90 seconds after the infusion, resting imagine   were obtained with ECG-gating.  Following the resting series, the patient   administered 45 mg of dipyridamole over four minutes.  The blood pressure,   heart rate and ECG were monitored and recorded.  After the dipyridamole   infusion was completed, another transmission scan for attenuation correction   was obtained.  The patient was then administered 28.7 mCi of Rubidium-82.    Approximately 90 seconds after the infusion, Peak stress images were obtained   with ECG-gating.     CLINICAL RESPONSE:  Resting blood pressure was 125/62 with a heart rate of 63.    Immediately post-dipyridamole infusion the blood pressure was 102/42 with a   heart rate of 75.  After a recovery period the blood pressure was 101/44 with   a heart rate of 71.     The patient experienced flushing and chest tightness, headache and dizziness   symptoms during testing.     Aminophylline 100 mg was administered following the scan.     ELECTROCARDIOGRAPHIC FINDINGS:  At baseline, there is normal sinus rhythm   without ST or T-wave abnormalities.  There are no ischemic changes with   stress.  This is a negative stress ECG for ischemia.    SCINTOGRAPHIC FINDINGS:  There is normal homogeneous tracer uptake in the LV   myocardium both at rest and with stress.  There  was significant   subdiaphragmatic activity at rest and with stress.  There is no visually   evident transient ischemic dilatation.    GATED WALL MOTION FINDINGS:  There is normal left ventricular wall motion.    LVEF is 70%.    CONCLUSIONS AND IMPRESSIONS:  1.  Negative pharmacologic PET stress test for ischemia or infarction.  2.  Normal left ventricular ejection fraction.       ____________________________________     MD AIME Ruiz / JESENIA    DD:  05/22/2017 16:19:57  DT:  05/22/2017 17:51:11    D#:  [id number]  Job#:  [id number]

## 2017-05-23 NOTE — TELEPHONE ENCOUNTER
Pt notified of non concerning results, to maintain current medications and follow up as scheduled with  To 7/18/17

## 2017-06-01 ENCOUNTER — APPOINTMENT (OUTPATIENT)
Dept: NEPHROLOGY | Facility: MEDICAL CENTER | Age: 62
End: 2017-06-01
Payer: MEDICAID

## 2017-06-01 ENCOUNTER — HOSPITAL ENCOUNTER (OUTPATIENT)
Dept: LAB | Facility: MEDICAL CENTER | Age: 62
End: 2017-06-01
Attending: INTERNAL MEDICINE
Payer: MEDICAID

## 2017-06-01 DIAGNOSIS — N18.5 CKD (CHRONIC KIDNEY DISEASE), STAGE V (HCC): ICD-10-CM

## 2017-06-01 DIAGNOSIS — N25.81 HYPERPARATHYROIDISM DUE TO RENAL INSUFFICIENCY (HCC): ICD-10-CM

## 2017-06-01 DIAGNOSIS — D64.9 ANEMIA, UNSPECIFIED TYPE: ICD-10-CM

## 2017-06-01 LAB
25(OH)D3 SERPL-MCNC: 6 NG/ML (ref 30–100)
ANION GAP SERPL CALC-SCNC: 6 MMOL/L (ref 0–11.9)
BUN SERPL-MCNC: 28 MG/DL (ref 8–22)
CALCIUM SERPL-MCNC: 8.9 MG/DL (ref 8.5–10.5)
CHLORIDE SERPL-SCNC: 109 MMOL/L (ref 96–112)
CO2 SERPL-SCNC: 24 MMOL/L (ref 20–33)
CREAT SERPL-MCNC: 2.4 MG/DL (ref 0.5–1.4)
CREAT UR-MCNC: 69.8 MG/DL
ERYTHROCYTE [DISTWIDTH] IN BLOOD BY AUTOMATED COUNT: 38.5 FL (ref 35.9–50)
GFR SERPL CREATININE-BSD FRML MDRD: 20 ML/MIN/1.73 M 2
GLUCOSE SERPL-MCNC: 93 MG/DL (ref 65–99)
HCT VFR BLD AUTO: 35.4 % (ref 37–47)
HGB BLD-MCNC: 11.4 G/DL (ref 12–16)
MCH RBC QN AUTO: 27.9 PG (ref 27–33)
MCHC RBC AUTO-ENTMCNC: 32.2 G/DL (ref 33.6–35)
MCV RBC AUTO: 86.6 FL (ref 81.4–97.8)
MICROALBUMIN UR-MCNC: 43.2 MG/DL
MICROALBUMIN/CREAT UR: 619 MG/G (ref 0–30)
PHOSPHATE SERPL-MCNC: 3.8 MG/DL (ref 2.5–4.5)
PLATELET # BLD AUTO: 351 K/UL (ref 164–446)
PMV BLD AUTO: 9.9 FL (ref 9–12.9)
POTASSIUM SERPL-SCNC: 4.3 MMOL/L (ref 3.6–5.5)
PTH-INTACT SERPL-MCNC: 290.5 PG/ML (ref 14–72)
RBC # BLD AUTO: 4.09 M/UL (ref 4.2–5.4)
SODIUM SERPL-SCNC: 139 MMOL/L (ref 135–145)
WBC # BLD AUTO: 10.2 K/UL (ref 4.8–10.8)

## 2017-06-01 PROCEDURE — 82570 ASSAY OF URINE CREATININE: CPT

## 2017-06-01 PROCEDURE — 82306 VITAMIN D 25 HYDROXY: CPT

## 2017-06-01 PROCEDURE — 82043 UR ALBUMIN QUANTITATIVE: CPT

## 2017-06-01 PROCEDURE — 84100 ASSAY OF PHOSPHORUS: CPT

## 2017-06-01 PROCEDURE — 83970 ASSAY OF PARATHORMONE: CPT

## 2017-06-01 PROCEDURE — 80048 BASIC METABOLIC PNL TOTAL CA: CPT

## 2017-06-01 PROCEDURE — 36415 COLL VENOUS BLD VENIPUNCTURE: CPT

## 2017-06-01 PROCEDURE — 85027 COMPLETE CBC AUTOMATED: CPT

## 2017-06-07 ENCOUNTER — OFFICE VISIT (OUTPATIENT)
Dept: NEPHROLOGY | Facility: MEDICAL CENTER | Age: 62
End: 2017-06-07
Payer: MEDICAID

## 2017-06-07 ENCOUNTER — APPOINTMENT (OUTPATIENT)
Dept: NEPHROLOGY | Facility: MEDICAL CENTER | Age: 62
End: 2017-06-07
Payer: MEDICAID

## 2017-06-07 VITALS
SYSTOLIC BLOOD PRESSURE: 140 MMHG | RESPIRATION RATE: 16 BRPM | TEMPERATURE: 98.2 F | HEIGHT: 61 IN | HEART RATE: 70 BPM | WEIGHT: 177 LBS | BODY MASS INDEX: 33.42 KG/M2 | DIASTOLIC BLOOD PRESSURE: 78 MMHG

## 2017-06-07 DIAGNOSIS — Z79.4 TYPE 2 DIABETES MELLITUS WITH DIABETIC NEPHROPATHY, WITH LONG-TERM CURRENT USE OF INSULIN (HCC): ICD-10-CM

## 2017-06-07 DIAGNOSIS — N18.4 CKD (CHRONIC KIDNEY DISEASE), STAGE IV (HCC): ICD-10-CM

## 2017-06-07 DIAGNOSIS — D63.8 ANEMIA OF CHRONIC DISEASE: ICD-10-CM

## 2017-06-07 DIAGNOSIS — E11.21 TYPE 2 DIABETES MELLITUS WITH DIABETIC NEPHROPATHY, WITH LONG-TERM CURRENT USE OF INSULIN (HCC): ICD-10-CM

## 2017-06-07 DIAGNOSIS — N25.81 HYPERPARATHYROIDISM DUE TO RENAL INSUFFICIENCY (HCC): ICD-10-CM

## 2017-06-07 DIAGNOSIS — I10 ESSENTIAL HYPERTENSION, BENIGN: ICD-10-CM

## 2017-06-07 PROBLEM — N18.5 CKD (CHRONIC KIDNEY DISEASE), STAGE V (HCC): Status: RESOLVED | Noted: 2017-05-17 | Resolved: 2017-06-07

## 2017-06-07 PROCEDURE — 99214 OFFICE O/P EST MOD 30 MIN: CPT | Performed by: INTERNAL MEDICINE

## 2017-06-07 RX ORDER — ERGOCALCIFEROL 1.25 MG/1
50000 CAPSULE ORAL
Qty: 10 CAP | Refills: 1 | Status: SHIPPED | OUTPATIENT
Start: 2017-06-07

## 2017-06-07 NOTE — MR AVS SNAPSHOT
"        Veda Ford   2017 2:45 PM   Office Visit   MRN: [id number]    Department:  [facility]   Dept Phone:  [phone]    Description:  Female : 1955   Provider:  Remedios Warner M.D.           Reason for Visit     Follow-Up           Allergies as of 2017     Allergen Noted Reactions    Gabapentin 2015   Unspecified    \"Leg aches and pains\"  RXN=2016      You were diagnosed with     CKD (chronic kidney disease), stage IV (CMS-[facility])   [618447]       Type 2 diabetes mellitus with diabetic nephropathy, with long-term current use of insulin (CMS-HCC)   [5761799]       Anemia of chronic disease   [2217041]       Essential hypertension, benign   [401.1.ICD-9-CM]       Hyperparathyroidism due to renal insufficiency (CMS-HCC)   [671266]         Vital Signs     Blood Pressure Pulse Temperature Respirations Height Weight    140/78 mmHg 70 36.8 °C (98.2 °F) (Temporal) 16 1.549 m (5' 1\") 80.287 kg (177 lb)    Body Mass Index Smoking Status                33.46 kg/m2 Never Smoker           Basic Information     Date Of Birth Sex Race Ethnicity Preferred Language    1955 Female  or   Origin (English,Puerto Rican,Indonesian,Anguillan, etc) English      Your appointments     2017  2:45 PM   Follow Up Visit with Remedios Warner M.D.   [facility] ([address] [place] [address])    [address] [place] [address] [address]  [place] [address] [phone]           You will be receiving a confirmation call a few days before your appointment from our automated call confirmation system.            2017  3:15 PM   FOLLOW UP with Lyly Leary M.D.   [facility] (--)    [address] [place] [address] [address]  [place] [id number] [phone]            2017  3:00 PM   Follow Up Visit with Remedios Warner M.D.   [facility] ([address] [place] [address])    [address] [place] [address] [address]  [place] [address]   "   [phone]           You will be receiving a confirmation call a few days before your appointment from our automated call confirmation system.              Problem List              ICD-10-CM Priority Class Noted - Resolved    Essential hypertension, benign I10   7/17/2009 - Present    Other and unspecified hyperlipidemia E78.5   7/17/2009 - Present    GERD (gastroesophageal reflux disease) K21.9   7/17/2009 - Present    Vitamin d deficiency    7/17/2009 - Present    Diabetes  High  7/17/2009 - Present    Shoulder joint pain M25.519   7/17/2009 - Present    Carpal tunnel syndrome G56.00   7/17/2009 - Present    Rotator cuff tear    9/4/2009 - Present    Diabetic neuropathy (CMS-HCC) E11.40   8/6/2010 - Present    Myalgia M79.1   8/6/2010 - Present    Renal insufficiency N28.9   11/15/2011 - Present    Hypothyroid E03.9   11/15/2011 - Present    Microalbuminuria R80.9   11/15/2011 - Present    Hyperglycemia R73.9 High  4/26/2012 - Present    Lower GI bleed K92.2   9/5/2013 - Present    DIABETES MELLITUS    9/6/2013 - Present    Acute blood loss anemia D62   9/6/2013 - Present    Chest pain R07.9   12/3/2015 - Present    Cholecystolithiasis K80.20   3/16/2016 - Present    Anasarca associated with disorder of kidney N04.9   3/27/2017 - Present    Renal failure (ARF), acute on chronic (CMS-[facility]) N17.9, N18.9   3/29/2017 - Present    Type 2 diabetes mellitus with diabetic nephropathy (CMS-HCC) E11.21   4/28/2017 - Present    History of hepatitis C Z86.19   4/28/2017 - Present    Anemia of chronic disease D63.8   4/29/2017 - Present    Obesity E66.9   4/29/2017 - Present    Vitamin D deficiency disease E55.9   5/17/2017 - Present    Hyperparathyroidism due to renal insufficiency (CMS-[facility]) N25.81   5/17/2017 - Present    CKD (chronic kidney disease), stage IV (CMS-[facility]) N18.4   6/7/2017 - Present      Health Maintenance        Date Due Completion Dates    DIABETES MONOFILAMENT / LE EXAM 3/27/1955 ---    RETINAL SCREENING  9/27/1972 ---    IMM DTaP/Tdap/Td Vaccine (1 - Tdap) 9/27/1973 ---    IMM PNEUMOCOCCAL 19-64 (ADULT) HIGHEST RISK SERIES (1 of 3 - PCV13) 9/27/1973 ---    PAP SMEAR 9/27/1975 ---    MAMMOGRAM 9/27/1994 ---    IMM ZOSTER VACCINE 9/27/2014 ---    A1C SCREENING 6/4/2016 12/4/2015, 4/26/2012, 11/12/2011, 6/21/2011, 12/6/2010, 4/13/2010, 12/15/2009, 1/5/2009, 8/26/2008, 2/27/2008, 7/10/2007, 3/24/2007, 8/29/2006    FASTING LIPID PROFILE 12/4/2016 12/4/2015, 4/26/2012, 11/12/2011, 6/21/2011, 12/6/2010, 4/13/2010, 12/15/2009, 7/31/2009, 8/26/2008, 2/27/2008, 7/10/2007, 3/24/2007, 8/29/2006    URINE ACR / MICROALBUMIN 6/1/2018 6/1/2017, 11/12/2011, 4/13/2010, 12/15/2009    SERUM CREATININE 6/1/2018 6/1/2017, 5/1/2017, 4/30/2017, 4/29/2017, 4/28/2017, 4/1/2017, 3/31/2017, 3/30/2017, 3/29/2017, 3/27/2017, 3/27/2017, 3/15/2016, 1/15/2016, 12/4/2015, 12/3/2015, 9/7/2013, 9/6/2013, 9/5/2013, 4/27/2012, 4/26/2012, 4/25/2012, 11/12/2011, 6/21/2011, 3/4/2011, 12/6/2010, 4/13/2010, 12/15/2009, 7/31/2009, 1/5/2009, 8/26/2008, 2/27/2008, 7/10/2007, 3/24/2007, 8/29/2006    COLONOSCOPY 9/6/2023 9/6/2013            Current Immunizations     Influenza TIV (IM) 9/5/2013  8:29 PM, 11/15/2011, 10/20/2010, 10/7/2009    Influenza Vaccine Adult HD 12/1/2016      Below and/or attached are the medications your provider expects you to take. Review all of your home medications and newly ordered medications with your provider and/or pharmacist. Follow medication instructions as directed by your provider and/or pharmacist. Please keep your medication list with you and share with your provider. Update the information when medications are discontinued, doses are changed, or new medications (including over-the-counter products) are added; and carry medication information at all times in the event of emergency situations     Allergies:  GABAPENTIN - Unspecified               Medications  Valid as of: June 07, 2017 -  2:43 PM    Generic Name Brand Name  Tablet Size Instructions for use    AmLODIPine Besylate (Tab) NORVASC 10 MG Take 1 Tab by mouth 2 Times a Day.        Atorvastatin Calcium (Tab) LIPITOR 40 MG Take 40 mg by mouth every evening.        Carvedilol (Tab) COREG 6.25 MG Take 1 Tab by mouth 2 times a day, with meals.        CloNIDine HCl (Tab) CATAPRES 0.1 MG Take 0.1 mg by mouth every evening.        Esomeprazole Magnesium (CAPSULE DELAYED RELEASE) NEXIUM 40 MG Take 40 mg by mouth as needed (For heartburn).        Furosemide (Tab) LASIX 40 MG Take 1 Tab by mouth BID 2 DAYS A WEEK.        Glimepiride (Tab) AMARYL 4 MG Take 4 mg by mouth 1 time daily as needed.        Insulin Glargine (Solution) LANTUS 100 UNIT/ML Inject 40 Units as instructed See Admin Instructions. Takes every morning but holds the PM dose often due to low sugars        Insulin Lispro (Solution) HUMALOG 100 UNIT/ML Inject 2-6 Units as instructed 3 times a day before meals. 151-200 2 units  201-250 3 units  251-300 5 units  301-350 6 units  351-400 8 units  Over 400 9 units and call MD        Loratadine (Tab) CLARITIN 10 MG Take 10 mg by mouth 1 time daily as needed.        .                 Medicines prescribed today were sent to:     [id number] - [place], [place] - [address] [place] [address]    Phone: [phone] Fax: [phone]    Open 24 Hours?: No      Medication refill instructions:       If your prescription bottle indicates you have medication refills left, it is not necessary to call your provider’s office. Please contact your pharmacy and they will refill your medication.    If your prescription bottle indicates you do not have any refills left, you may request refills at any time through one of the following ways: The online evOLED system (except Urgent Care), by calling your provider’s office, or by asking your pharmacy to contact your provider’s office with a refill request. Medication refills are processed only during regular business  hours and may not be available until the next business day. Your provider may request additional information or to have a follow-up visit with you prior to refilling your medication.   *Please Note: Medication refills are assigned a new Rx number when refilled electronically. Your pharmacy may indicate that no refills were authorized even though a new prescription for the same medication is available at the pharmacy. Please request the medicine by name with the pharmacy before contacting your provider for a refill.        Your To Do List     Future Labs/Procedures Complete By Expires    BASIC METABOLIC PANEL  As directed 12/5/2017    CBC WITHOUT DIFFERENTIAL  As directed 12/5/2017    PHOSPHORUS  As directed 12/5/2017    PTH INTACT (PTH ONLY)  As directed 6/8/2018    URINE CREATININE RANDOM  As directed 12/5/2017    URINE PROTEIN  As directed 12/5/2017      Other Notes About Your Plan     +UDS.  No further narcotics           MyChart Status: Patient Declined

## 2017-06-07 NOTE — PROGRESS NOTES
Nephrology Progress Note, Adult, Complex               Author: Remedios Timmy Date & Time created: 6/7/2017  2:45 PM     Interval History:  62 y/o female with DMN biopsy proven , CKD IV  Creat improved from 3.4 to 2.4  (+)nephrotic range proteinuria -to monitor  Doing better, no complaints  No uremic symptoms  HTN: BP well controlled, compliant to low Na diet  Anemia; Hb better  Vit D def    Review of Systems:  Review of Systems   Constitutional: Improved malaise/fatigue. Negative for fever and chills.   HENT: Negative for congestion, nosebleeds and sore throat.    Eyes: Negative.    Respiratory: Negative for cough, hemoptysis and wheezing.    Cardiovascular: Negative for chest pain, palpitations, orthopnea and leg swelling.   Gastrointestinal: Negative for heartburn, nausea, vomiting, abdominal pain and diarrhea.   Genitourinary: Negative for dysuria, urgency, frequency, hematuria and flank pain.   Musculoskeletal: Negative for myalgias, back pain, joint pain and neck pain.   Skin: Negative for itching and rash.   Neurological: Negative for dizziness, sensory change, focal weakness and headaches.     PMH/FH/SH /allergies and medications reviewed      Physical Exam   Constitutional: She is oriented to person, place, and time. She appears well-developed and well-nourished. No distress.   HENT:   Head: Normocephalic and atraumatic.   Mouth/Throat: Oropharynx is clear and moist.   Eyes: Conjunctivae and EOM are normal. Pupils are equal, round, and reactive to light.   Neck: Normal range of motion. Neck supple.   Cardiovascular: Normal rate and regular rhythm.  Exam reveals no gallop and no friction rub.    Pulmonary/Chest: Effort normal and breath sounds normal. No respiratory distress. She has no wheezes. She has no rales.   Abdominal: Soft. Bowel sounds are normal. She exhibits no distension and no mass. There is no tenderness.   Musculoskeletal: She exhibits trace pedal edema.   Neurological: She is alert and oriented  to person, place, and time. No cranial nerve deficit. Coordination normal.   Skin: Skin is warm. No rash noted. No erythema.   Nursing note and vitals reviewed.        Hemodynamics:  Temp (24hrs), Av.7 °C (98.1 °F), Min:36.3 °C (97.4 °F), Max:37.1 °C (98.7 °F)  Temperature: 36.8 °C (98.2 °F)  Pulse  Av.6  Min: 61  Max: 90   Blood Pressure: 140/78 mmHg        Weight: 80.287 kg (177 lb)    Labs reviewed and Medications reviewed    Lab Results   Component Value Date/Time    CREATININE 2.40* 2017 01:06 PM    POTASSIUM 4.3 2017 01:06 PM           Assessment and plan    1.CKD V  -improved to stage IV -to monitor  2.HTN: BP well controlled  3.Electrolytes: well controlled.  4.Anemia: Hb improved  5.Volume ; edema better with Lasix  6.Vit D def -start ergocalciferol 35596 units daily    Recs: reduce furosemide to 40 mg daily             No need for emergent dialysis             Avoid nephrotoxic agents                       Predialysis education             F/u in 6 weeks with CBC. BMP, Phos, PTH, vit D, UPC

## 2017-07-20 ENCOUNTER — APPOINTMENT (OUTPATIENT)
Dept: NEPHROLOGY | Facility: MEDICAL CENTER | Age: 62
End: 2017-07-20
Payer: MEDICAID

## 2017-11-01 ENCOUNTER — OFFICE VISIT (OUTPATIENT)
Dept: CARDIOLOGY | Facility: MEDICAL CENTER | Age: 62
End: 2017-11-01
Payer: MEDICAID

## 2017-11-01 VITALS
BODY MASS INDEX: 32.66 KG/M2 | HEIGHT: 61 IN | DIASTOLIC BLOOD PRESSURE: 72 MMHG | OXYGEN SATURATION: 91 % | HEART RATE: 84 BPM | SYSTOLIC BLOOD PRESSURE: 160 MMHG | WEIGHT: 173 LBS

## 2017-11-01 DIAGNOSIS — I10 ESSENTIAL HYPERTENSION, BENIGN: ICD-10-CM

## 2017-11-01 DIAGNOSIS — N18.4 CKD (CHRONIC KIDNEY DISEASE), STAGE IV (HCC): ICD-10-CM

## 2017-11-01 DIAGNOSIS — M79.89 LEG SWELLING: ICD-10-CM

## 2017-11-01 DIAGNOSIS — I10 HTN (HYPERTENSION), MALIGNANT: ICD-10-CM

## 2017-11-01 PROCEDURE — 99214 OFFICE O/P EST MOD 30 MIN: CPT | Performed by: INTERNAL MEDICINE

## 2017-11-01 RX ORDER — METOPROLOL TARTRATE 50 MG/1
50 TABLET, FILM COATED ORAL 2 TIMES DAILY
Qty: 60 TAB | Refills: 11 | Status: SHIPPED | OUTPATIENT
Start: 2017-11-01

## 2017-11-01 ASSESSMENT — ENCOUNTER SYMPTOMS
HALLUCINATIONS: 0
FALLS: 0
SENSORY CHANGE: 0
HEADACHES: 0
EYE PAIN: 0
WEIGHT LOSS: 0
ORTHOPNEA: 0
VOMITING: 0
DIZZINESS: 0
PND: 0
NAUSEA: 0
SPEECH CHANGE: 0
ABDOMINAL PAIN: 0
COUGH: 0
EYE DISCHARGE: 0
SHORTNESS OF BREATH: 0
FEVER: 0
DEPRESSION: 0
BLURRED VISION: 0
CLAUDICATION: 0
BLOOD IN STOOL: 0
LOSS OF CONSCIOUSNESS: 0
CHILLS: 0
BRUISES/BLEEDS EASILY: 0
MYALGIAS: 0
DOUBLE VISION: 0
PALPITATIONS: 0

## 2017-11-01 NOTE — PROGRESS NOTES
"Subjective:   Veda Ford is a 62 y.o. female who presents today for cardiac care due to prior report of chest pain. Patient underwent transthoracic echocardiogram and myocardial PET scan stress test which showed relatively normal findings. No evidence of valvular disease or ischemia. Normal left ventricular systolic function.    She does have advance kidney problem. She also has latex swelling persisted.    Continue to have some sporadic chest pain. Her blood pressures elevated however.    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   • Arthritis     lower back, shoulders, hands   • Backpain    • Bowel habit changes    • Breath shortness    • CATARACT    • Cold feb 2016   • Diabetes    • GERD (gastroesophageal reflux disease)    • Heart burn    • Hepatitis B     pt states doctor cleared her off disease   • Hypertension    • hypothyroid     pt self reported   • Indigestion    • Jaundice    • Muscle disorder    • Sleep apnea    • Snoring      Past Surgical History:   Procedure Laterality Date   • MAGO BY LAPAROSCOPY  3/16/2016    Procedure: MAGO BY LAPAROSCOPY;  Surgeon: Oskar Cheng M.D.;  Location: SURGERY [facility];  Service:    • OTHER Bilateral 2015    catacrat surgery   • GASTROSCOPY  9/6/2013    Performed by Dwight Back M.D. at SURGERY [facility]   • COLONOSCOPY  9/6/2013    Performed by Dwight Back M.D. at SURGERY [facility]     Family History   Problem Relation Age of Onset   • Diabetes Mother    • Heart Disease Father    • Lung Disease Father    • Diabetes Brother      History   Smoking Status   • Never Smoker   Smokeless Tobacco   • Never Used     Allergies   Allergen Reactions   • Gabapentin Unspecified     \"Leg aches and pains\"  RXN=1/2016     Outpatient Encounter Prescriptions as of 11/1/2017   Medication Sig Dispense Refill   • metoprolol (LOPRESSOR) 50 MG Tab Take 1 Tab by mouth 2 times a day. 60 Tab 11   • ergocalciferol (DRISDOL) 36382 " UNIT capsule Take 1 Cap by mouth every 7 days. 10 Cap 1   • furosemide (LASIX) 40 MG Tab Take 1 Tab by mouth BID 2 DAYS A WEEK. (Patient taking differently: Take 40 mg by mouth every day.) 60 Tab 1   • amlodipine (NORVASC) 10 MG Tab Take 1 Tab by mouth 2 Times a Day. 30 Tab 3   • loratadine (CLARITIN) 10 MG Tab Take 10 mg by mouth 1 time daily as needed.     • atorvastatin (LIPITOR) 40 MG Tab Take 40 mg by mouth every evening.     • clonidine (CATAPRES) 0.1 MG Tab Take 0.1 mg by mouth every evening.     • insulin glargine (LANTUS) 100 UNIT/ML SOLN Inject 40 Units as instructed See Admin Instructions. Takes every morning but holds the PM dose often due to low sugars     • insulin lispro (HUMALOG KWIKPEN) 100 UNIT/ML SOLN Inject 2-6 Units as instructed 3 times a day before meals. 151-200 2 units  201-250 3 units  251-300 5 units  301-350 6 units  351-400 8 units  Over 400 9 units and call MD     • [DISCONTINUED] carvedilol (COREG) 6.25 MG Tab Take 1 Tab by mouth 2 times a day, with meals. 60 Tab 3   • glimepiride (AMARYL) 4 MG Tab Take 4 mg by mouth 1 time daily as needed.     • esomeprazole (NEXIUM) 40 MG delayed-release capsule Take 40 mg by mouth as needed (For heartburn).       No facility-administered encounter medications on file as of 11/1/2017.      Review of Systems   Constitutional: Negative for chills, fever, malaise/fatigue and weight loss.   HENT: Negative for ear discharge, ear pain, hearing loss and nosebleeds.    Eyes: Negative for blurred vision, double vision, pain and discharge.   Respiratory: Negative for cough and shortness of breath.    Cardiovascular: Negative for chest pain, palpitations, orthopnea, claudication, leg swelling and PND.   Gastrointestinal: Negative for abdominal pain, blood in stool, melena, nausea and vomiting.   Genitourinary: Negative for dysuria and hematuria.   Musculoskeletal: Negative for falls, joint pain and myalgias.   Skin: Negative for itching and rash.  "  Neurological: Negative for dizziness, sensory change, speech change, loss of consciousness and headaches.   Endo/Heme/Allergies: Negative for environmental allergies. Does not bruise/bleed easily.   Psychiatric/Behavioral: Negative for depression, hallucinations and suicidal ideas.        Objective:   /72   Pulse 84   Ht 1.549 m (5' 1\")   Wt 78.5 kg (173 lb)   SpO2 91%   BMI 32.69 kg/m²     Physical Exam   Constitutional: She is oriented to person, place, and time. No distress.   HENT:   Head: Normocephalic and atraumatic.   Eyes: EOM are normal.   Neck: No JVD present.   Cardiovascular: Normal rate, regular rhythm, normal heart sounds and intact distal pulses.  Exam reveals no gallop and no friction rub.    No murmur heard.  Pulmonary/Chest: No respiratory distress. She has no wheezes. She has no rales. She exhibits no tenderness.   Abdominal: She exhibits no distension. There is no tenderness. There is no rebound and no guarding.   Musculoskeletal: She exhibits no edema or tenderness.   Lymphadenopathy:     She has no cervical adenopathy.   Neurological: She is alert and oriented to person, place, and time.   Skin: Skin is dry.   Psychiatric: She has a normal mood and affect.   Nursing note and vitals reviewed.      Assessment:     1. HTN (hypertension), malignant  metoprolol (LOPRESSOR) 50 MG Tab   2. CKD (chronic kidney disease), stage IV (CMS-HCC)     3. Essential hypertension, benign     4. Leg swelling         Medical Decision Making:  Today's Assessment / Status / Plan:   At this time I do not feel that she has active cardiac condition.    I do think that her symptomatology of chest pain might be related to hypertensive heart disease. We will stop carvedilol therapy as she was not able to tolerated due to GI upset. We will start her on Lopressor 50 mg by mouth twice a day.    In terms of her leg swelling, I do feel that this is related to her kidney problem. She will be evaluated by nephrology " soon.

## 2021-03-03 DIAGNOSIS — Z23 NEED FOR VACCINATION: ICD-10-CM
